# Patient Record
Sex: FEMALE | Race: WHITE | Employment: FULL TIME | URBAN - METROPOLITAN AREA
[De-identification: names, ages, dates, MRNs, and addresses within clinical notes are randomized per-mention and may not be internally consistent; named-entity substitution may affect disease eponyms.]

---

## 2020-02-16 ENCOUNTER — HOSPITAL ENCOUNTER (EMERGENCY)
Age: 55
Discharge: HOME | End: 2020-02-16
Attending: EMERGENCY MEDICINE
Payer: SELF-PAY

## 2020-02-16 ENCOUNTER — APPOINTMENT (OUTPATIENT)
Dept: CT IMAGING | Age: 55
End: 2020-02-16
Attending: EMERGENCY MEDICINE
Payer: SELF-PAY

## 2020-02-16 VITALS
RESPIRATION RATE: 16 BRPM | HEART RATE: 65 BPM | HEIGHT: 64 IN | DIASTOLIC BLOOD PRESSURE: 101 MMHG | SYSTOLIC BLOOD PRESSURE: 172 MMHG | TEMPERATURE: 97.8 F | OXYGEN SATURATION: 100 % | BODY MASS INDEX: 21.38 KG/M2 | WEIGHT: 125.22 LBS

## 2020-02-16 DIAGNOSIS — S01.01XA LACERATION OF SCALP, INITIAL ENCOUNTER: ICD-10-CM

## 2020-02-16 DIAGNOSIS — S00.03XA CONTUSION OF SCALP, INITIAL ENCOUNTER: Primary | ICD-10-CM

## 2020-02-16 PROCEDURE — 70450 CT HEAD/BRAIN W/O DYE: CPT

## 2020-02-16 PROCEDURE — 99281 EMR DPT VST MAYX REQ PHY/QHP: CPT

## 2020-02-16 NOTE — ED TRIAGE NOTES
Pt. States she went out side last night after midnight to smoke and missed a step and fell on bottom and then hit the back of her head and has a lac to back of head. Pt. Denies any LOC or N/V. Pt. Juany Gannon to [facility] and they sent here here \"for a cat scan\".

## 2020-02-16 NOTE — ED PROVIDER NOTES
Mrs. Genet Varela is a 28-year-old female who presents to the with complaints of a fall last night. At approximately midnight, she was walking down the steps. She slipped and fell back and hit her head on the sidewalk. She did not lose consciousness. She said that she felt well before the fall. She did not see much intentional last night because she hit her head. She denies nausea or vomiting. She complains of headache. [facility] and was referred to the ER for head CT. No vomiting. No numbness, tingling, weakness. She denies any other complaints. She states that she is up-to-date with her tetanus shot. Her last shot was 4 years ago. Past Medical History:   Diagnosis Date    Arthritis     Hypertension     Ill-defined condition     ? ? Lupus       Past Surgical History:   Procedure Laterality Date    ABDOMEN SURGERY PROC UNLISTED      HX ORTHOPAEDIC      right foot bunion removed         History reviewed. No pertinent family history. Social History     Socioeconomic History    Marital status: LEGALLY      Spouse name: Not on file    Number of children: Not on file    Years of education: Not on file    Highest education level: Not on file   Occupational History    Not on file   Social Needs    Financial resource strain: Not on file    Food insecurity:     Worry: Not on file     Inability: Not on file    Transportation needs:     Medical: Not on file     Non-medical: Not on file   Tobacco Use    Smoking status: Current Every Day Smoker     Packs/day: 0.50    Smokeless tobacco: Never Used   Substance and Sexual Activity    Alcohol use:  Yes     Alcohol/week: 10.0 standard drinks     Types: 10 Cans of beer per week    Drug use: No    Sexual activity: Not on file   Lifestyle    Physical activity:     Days per week: Not on file     Minutes per session: Not on file    Stress: Not on file   Relationships    Social connections:     Talks on phone: Not on file     Gets together: Not on file     Attends [employer] service: Not on file     Active member of club or organization: Not on file     Attends meetings of clubs or organizations: Not on file     Relationship status: Not on file    Intimate partner violence:     Fear of current or ex partner: Not on file     Emotionally abused: Not on file     Physically abused: Not on file     Forced sexual activity: Not on file   Other Topics Concern    Not on file   Social History Narrative    Not on file         ALLERGIES: Carrot and Codeine    Review of Systems   Constitutional: Negative for chills and fever. HENT: Negative for rhinorrhea and sore throat. Respiratory: Negative for cough and shortness of breath. Cardiovascular: Negative for chest pain. Gastrointestinal: Negative for abdominal pain, diarrhea, nausea and vomiting. Genitourinary: Negative for dysuria and urgency. Musculoskeletal: Negative for arthralgias and back pain. Skin: Positive for wound. Negative for rash. Neurological: Positive for headaches. Negative for dizziness, weakness and light-headedness.        Vitals:    02/16/20 1604   BP: (!) 172/101   Pulse: 65   Resp: 16   Temp: 97.8 °F (36.6 °C)   SpO2: 100%   Weight: 56.8 kg (125 lb 3.5 oz)   Height: 5' 4\" (1.626 m)            Physical Exam     Const:  No acute distress, well developed, well nourished  Head:  Atraumatic, normocephalic  Eyes:  PERRL, conjunctiva normal, no scleral icterus  Neck:  Supple, trachea midline  Cardiovascular:  Regular rate  Resp:  No resp distress, no increased work of breathing  Abd:  non-distended  :  Deferred  MSK:  No pedal edema, normal ROM  Neuro:  Alert and oriented x3, no cranial nerve defect  Skin:  3 cm superficial laceration to the occiput  Psych: normal mood and affect, behavior is normal, judgement and thought content is normal        MDM  Number of Diagnoses or Management Options  Contusion of scalp, initial encounter: Laceration of scalp, initial encounter:      Amount and/or Complexity of Data Reviewed  Tests in the radiology section of CPT®: ordered and reviewed  Review and summarize past medical records: yes    Patient Progress  Patient progress: stable          Mrs. Natasha Linares is a 60-year-old female presents to the ER after injuring her head. She is well-appearing in the ER. CT is negative for bleed or fracture. Her laceration occurred approximately 15 hours prior to arrival in the ER. Had a lengthy discussion with her about the risk and benefits of closing her wound. She elected to not have a closed this time. She Would like a bandage placed. Therefore, we will bandage her wound before she goes. She she is to follow-up with her doctor or return to the ER with any new or worsening symptoms.          Procedures

## 2020-02-16 NOTE — ED NOTES
Dr. Romayne Olp and I have reviewed discharge instructions with the patient. The patient verbalized understanding. Head was wrapped with coban and pt. States it made it feel better.

## 2020-08-19 ENCOUNTER — NURSE TRIAGE (OUTPATIENT)
Dept: OTHER | Facility: CLINIC | Age: 55
End: 2020-08-19

## 2020-08-19 NOTE — TELEPHONE ENCOUNTER
Pt needing scheduled for rheumatologist     Reason for Disposition   Caller has already spoken with another triager or PCP (or office), and has further questions and triager able to answer questions.     Protocols used: NO CONTACT OR DUPLICATE CONTACT CALL-ADULT-[place]

## 2020-12-02 ENCOUNTER — OFFICE VISIT (OUTPATIENT)
Dept: RHEUMATOLOGY | Age: 55
End: 2020-12-02
Payer: MEDICAID

## 2020-12-02 VITALS
TEMPERATURE: 96.9 F | HEIGHT: 64 IN | BODY MASS INDEX: 23.05 KG/M2 | SYSTOLIC BLOOD PRESSURE: 135 MMHG | DIASTOLIC BLOOD PRESSURE: 83 MMHG | WEIGHT: 135 LBS | HEART RATE: 74 BPM | RESPIRATION RATE: 18 BRPM

## 2020-12-02 DIAGNOSIS — M19.042 PRIMARY OSTEOARTHRITIS OF BOTH HANDS: ICD-10-CM

## 2020-12-02 DIAGNOSIS — Z79.60 LONG-TERM USE OF IMMUNOSUPPRESSANT MEDICATION: ICD-10-CM

## 2020-12-02 DIAGNOSIS — M19.041 PRIMARY OSTEOARTHRITIS OF BOTH HANDS: ICD-10-CM

## 2020-12-02 DIAGNOSIS — M06.9 RHEUMATOID ARTHRITIS WITH UNKNOWN RHEUMATOID FACTOR STATUS (HCC): Primary | ICD-10-CM

## 2020-12-02 DIAGNOSIS — M16.0 BILATERAL PRIMARY OSTEOARTHRITIS OF HIP: ICD-10-CM

## 2020-12-02 PROCEDURE — 99205 OFFICE O/P NEW HI 60 MIN: CPT | Performed by: INTERNAL MEDICINE

## 2020-12-02 RX ORDER — IBUPROFEN 200 MG
400 TABLET ORAL
COMMUNITY
End: 2021-05-04 | Stop reason: ALTCHOICE

## 2020-12-02 RX ORDER — HYDROXYCHLOROQUINE SULFATE 200 MG/1
400 TABLET, FILM COATED ORAL DAILY
Qty: 180 TAB | Refills: 1 | Status: SHIPPED | OUTPATIENT
Start: 2020-12-02 | End: 2021-05-04 | Stop reason: ALTCHOICE

## 2020-12-02 NOTE — PROGRESS NOTES
REASON FOR VISIT    This is the initial evaluation for Ms. Rosa chavez 54 y.o. WHITE OR  female for question of an inflammatory arthritis. The patient is self-referred to the [facility]. HISTORY OF PRESENT ILLNESS      I have reviewed and summarized old records from [place]    In 2015, she developed pain in her hands (PIPs, MCP). She saw rheumatology, Dr. Noberto Prader, who diagnosed her with Rheumatoid Arthritis who had her on hydroxychloroquine 200 mg daily which helped very much. She could not follow up due to insurance changes so has been off for 2 years. Today, she complains of pain in her hands that is aching and some throbbing. She has difficulty opening jars due to . The pain is all day but improves with use unless she over uses them. Warm water and heat helps. She has morning stiffness lasting at least an hour. cold makes her feel worse. Ibuprofen helps when she takes it. She also has pain in her [facility] that is worse with grabbing. She also reports outer hip stiffness that is worse in the morning lasting an hour and improves with activity. She has pain in her hip (groin) when she first bears weight and moves, it improves. Ibuprofen also helps. She has little stiffness in her shoulders. She has some pain pain in her MTPs (feet) after work and standing but not in the morning. She had bunionectomy 20 years so does not wear high heals. She reports having a high pain tolerance. She is a smoker for 35 years. Therapy History includes:  Current DMARD therapy includes: none  Prior DMARD therapy includes: hydroxychloroquine 200 mg daily (2015 to 2017)  The following DMARDs have been ineffective: none  The following DMARDs were stopped because of side effects: none    REVIEW OF SYSTEMS    A 15 point review of systems was performed and summarized below. The questionnaire was reviewed with the patient and scanned into the patient's medical record.     General: denies recent weight gain, recent weight loss, fatigue, weakness, fever, drenching night sweats  Musculoskeletal: endorses joint pain, joint swelling, morning stiffness (lasting 1.5 hours), denies muscle pain  Ears: denies ringing in ears, hearing loss, deafness  Eyes: denies pain, light sensitive, redness, blindness, double vision, blurred vision, excess tearing, dryness, foreign body sensation  Mouth: denies sore tongue, oral ulcers, loss of taste, dryness, increased dental caries  Nose: denies nosebleeds, nasal ulcers  Throat: denies food stuck when swallowing, difficulty with swallowing, hoarseness, pain in jaw while chewing  Neck: denies swollen glands, tender glands  Cardiopulmonary: denies pain in chest with deep breaths, pain in chest when lying down, murmurs, sudden changes in heart beat, wheezing, dry cough, productive cough, shortness of breath at rest, shortness of breath on exertion, coughing of blood  Gastrointestinal: denies nausea, heartburn, stomach pain relieved by food, chronic constipation, chronic diarrhea, blood in stools, black stools  Genitourinary: denies vaginal dryness, pain or burning on urination, blood in urine, cloudy urine, vaginal ulcers   Hematologic: denies anemia, bleeding tendency, blood clots, bleeding gums  Skin: denies easy bruising, hair loss, rash, rash worsened after sun exposure, hives/urticaria, skin thickening, skin tightness, nodules/bumps, color changes of hands or feet in the cold (Raynaud's)  Neurologic: denies numbness or tingling in hands, numbness or tingling in feet, muscle weakness  Psychiatric: denies depression, excessive worries, PTSD, Bipolar  Sleep: endorses snoring, denies poor sleep (7-9 hours), apnea, daytime somnolence, difficulty falling asleep, difficulty staying asleep     PAST MEDICAL HISTORY    She has a past medical history of Arthritis and Hypertension.      FAMILY HISTORY    Her family history includes Arthritis-osteo in her father and mother; Arthritis-rheumatoid in her brother; Diabetes in her father and mother; Gout in her brother and sister; Heart Attack in her father; Other in her father and mother. SOCIAL HISTORY    She reports that she has been smoking. She has been smoking about 0.50 packs per day. She has never used smokeless tobacco. She reports current alcohol use of about 10.0 standard drinks of alcohol per week. She reports that she does not use drugs. MEDICATIONS    Current Outpatient Medications   Medication Sig    ibuprofen (MOTRIN) 200 mg tablet Take 400 mg by mouth every six (6) hours as needed for Pain.  hydrOXYchloroQUINE (PLAQUENIL) 200 mg tablet Take 2 Tabs by mouth daily. No current facility-administered medications for this visit. ALLERGIES  Allergies   Allergen Reactions    Carrot Other (comments)     \"upset stomach\"    Codeine Palpitations       PHYSICAL EXAMINATION    Visit Vitals  /83   Pulse 74   Temp 96.9 °F (36.1 °C)   Resp 18   Ht 5' 4\" (1.626 m)   Wt 135 lb (61.2 kg)   BMI 23.17 kg/m²     Body mass index is 23.17 kg/m². General: Patient is alert, oriented x 3, not in acute distress    HEENT:   Conjunctiva are not injected and appear moist, there is no alopecia. Cardiovascular:  Heart is regular rate and rhythm, no murmurs. Chest:  Lungs are clear to auscultation bilaterally. Extremities:  Free of clubbing, cyanosis, edema, extremities well perfused. Neurological exam:  Muscle strength is full in upper and lower extremities. Skin exam:  There are no rashes, no tophi, no psoriasis, no active Raynaud's, no livedo reticularis, no periungual erythema. Musculoskeletal exam:  A comprehensive musculoskeletal exam was performed for all joints of each upper and lower extremity and assessed for swelling, tenderness and range of motion. Pertinent results are documented as below:    Bilateral Tanner and Heberden nodes.   Bilateral squaring of CMC  Pain in left hip with inward rotation with decreased ROM  Pain in right right with outward rotation with decreased ROM  Bilateral knee crepitus without effusion. Bilateral MTP squeeze tenderness    Z-Deformities:   no  Daphne Neck Deformities:  no  Boutonierre's Deformities:  no  Ulnar Deviation:   no  MCP Subluxation:  no    Joint Count 12/2/2020   Patient pain (0-100) 70   MHAQ 0.125   Left 1st MCP - Tender 0   Left 1st MCP - Swollen 1   Left 2nd MCP - Tender 1   Left 2nd MCP - Swollen 1   Left 4th MCP - Tender 1   Left 4th MCP - Swollen 0   Left 5th MCP - Tender 1   Left 5th MCP - Swollen 1   Left thumb IP - Tender 1   Left thumb IP - Swollen 1   Left 2nd PIP - Tender 0   Left 2nd PIP - Swollen 1   Left 3rd PIP - Tender 0   Left 3rd PIP - Swollen 1   Left 4th PIP - Tender 1   Left 4th PIP - Swollen 1   Left 5th PIP - Tender 1   Left 5th PIP - Swollen 1   Right elbow - Tender 1   Right elbow - Swollen 1   Right wrist- Tender 1   Right wrist- Swollen 0   Right 1st MCP - Tender 0   Right 1st MCP - Swollen 1   Right 2nd PIP - Tender 0   Right 2nd PIP - Swollen 1   Right 3rd PIP - Tender 1   Right 3rd PIP - Swollen 1   Right 4th PIP - Tender 1   Right 4th PIP - Swollen 1   Right 5th PIP - Tender 1   Tender Joint Count (Total) 11   Swollen Joint Count (Total) 13   Physician Assessment (0-10) 4   Patient Assessment (0-10) 5   CDAI Total (calculated) 33       DATA REVIEW    Prior medical records were reviewed and are summarized as below:    Laboratory data: summarized in the HPI    Imaging: summarized in the HPI. ASSESSMENT AND PLAN    1) Rheumatoid Arthritis. She has a history of symmetric synovitis treated previously by Dr. Radha Beasley with hydroxychlorquine 200 mg daily with relief. She has been off for more than 2 years due to insurance issues.     Interestingly, she also has bilateral hip involvement, which suggests an inflammatory process versus osteoarthritis     I will resume hydroxychloroquine weight based at 400 mg daily since it had helped her previously, and reassess her in 3 months. I ordered labs and radiographs. 2) Long Term Use of Hydroxychloroquine (Plaquenil). She has annual exams. She will initiate therapy and was counseled about monitoring. 3) Bilateral Hand/ CMCOsteoarthritis. The patient has osteoarthritis, which is also known as \"wear and tear arthritis,\" non-inflammatory arthritis or mechanical arthritis. There are hereditary, vocational and posttraumatic joint injuries predisposing factors. I recommend non-pharmacologic modalities such as keeping hands warm, avoid activities that case pain, warm water soaks, in addition to (2) pharmacologic modalities such as acetaminophen as first line, oral and topical NSAIDs as second line. Naproxen is a low HERNANDEZ-2 selectivity inhibitor that poses lower cardiovascular risk than other NSAIDs (Angiomatthew VALE, Mehrdad BERMAN. Clinical Pharmacology and Cardiovascular Safety of Naproxen. Am J Cardiovasc Drugs. 2016 Nov 8). NSAIDs should not be used in patients on blood thinners, chronic kidney disease, high risk coronary artery disease, and inflammatory bowel disease (ulcerative colitis or Crohn's disease) I recommended ball squeezing and holding until hand fatigue then alternating to other hand exercises 10 times twice daily. 4) Bilateral Hip Arthritis. See #1. The patient voiced understanding of the aforementioned assessment and plan. Summary of plan was provided in the After Visit Summary patient instructions. I also provided education about [employer] setup and utility.     TODAY'S ORDERS  Orders Placed This Encounter    QUANTIFERON-TB GOLD PLUS    XR HAND RT MIN 3 V    XR FOOT LT MIN 3 V    XR FOOT RT MIN 3 V    XR HAND LT MIN 3 V    XR HIPS BI W PELV 3 OR 4 VWS    CYCLIC CITRUL PEPTIDE AB, IGG    CBC WITH AUTOMATED DIFF    CHRONIC HEPATITIS PANEL    METABOLIC PANEL, COMPREHENSIVE    C REACTIVE PROTEIN, QT    SED RATE (ESR)    RHEUMATOID FACTOR, QL    PROTEIN ELECTROPHORESIS W/ REFLX NEYDA    URIC ACID    VITAMIN D, 25 HYDROXY    hydrOXYchloroQUINE (PLAQUENIL) 200 mg tablet     Future Appointments   Date Time Provider Chidi Pollard   3/3/2021  1:00 PM Princess Ashley Benites MD [facility] BS AMB     Héctor Fernandez MD, [address] [place] [address]    Adult Rheumatology   Rheumatology Ultrasound Certified  [id number] [facility] [address]  [facility], [address] [place] [address]   Phone [phone]  Fax [phone]

## 2020-12-02 NOTE — LETTER
12/2/20 Patient: Aramis Velasquez YOB: 1965 Date of Visit: 12/2/2020 Deepika Vasquez MD 
[address] [id number] VIA Facsimile: [phone] Dear Deepika Vasquez MD, Thank you for referring Ms. Aramis Velasquez to [facility] for evaluation. My notes for this consultation are attached. If you have questions, please do not hesitate to call me. I look forward to following your patient along with you.  
 
 
Sincerely, 
 
Viji Brunson MD

## 2021-01-21 ENCOUNTER — LAB ONLY (OUTPATIENT)
Dept: FAMILY MEDICINE CLINIC | Age: 56
End: 2021-01-21

## 2021-01-21 DIAGNOSIS — M06.9 RHEUMATOID ARTHRITIS WITH UNKNOWN RHEUMATOID FACTOR STATUS (HCC): ICD-10-CM

## 2021-01-22 LAB — CCP IGA+IGG SERPL IA-ACNC: 9 UNITS (ref 0–19)

## 2021-01-23 LAB
ALBUMIN SERPL ELPH-MCNC: 3.9 G/DL (ref 2.9–4.4)
ALBUMIN/GLOB SERPL: 1.4 {RATIO} (ref 0.7–1.7)
ALPHA1 GLOB SERPL ELPH-MCNC: 0.2 G/DL (ref 0–0.4)
ALPHA2 GLOB SERPL ELPH-MCNC: 0.8 G/DL (ref 0.4–1)
B-GLOBULIN SERPL ELPH-MCNC: 1 G/DL (ref 0.7–1.3)
COMMENT, 144067: NORMAL
GAMMA GLOB SERPL ELPH-MCNC: 0.9 G/DL (ref 0.4–1.8)
GLOBULIN SER CALC-MCNC: 2.8 G/DL (ref 2.2–3.9)
HBV CORE AB SERPL QL IA: NEGATIVE
HBV CORE IGM SERPL QL IA: NEGATIVE
HBV E AB SERPL QL IA: NEGATIVE
HBV E AG SERPL QL IA: NEGATIVE
HBV SURFACE AB SER QL: NON REACTIVE INDEX VALUE
HBV SURFACE AG SERPL QL IA: NEGATIVE
HCV AB S/CO SERPL IA: <0.1 S/CO RATIO (ref 0–0.9)
M PROTEIN SERPL ELPH-MCNC: NORMAL G/DL
PROT PATTERN SERPL ELPH-IMP: NORMAL
PROT SERPL-MCNC: 6.7 G/DL (ref 6–8.5)

## 2021-01-24 LAB
25(OH)D3 SERPL-MCNC: 13.9 NG/ML (ref 30–100)
ALBUMIN SERPL-MCNC: 3.9 G/DL (ref 3.5–5)
ALBUMIN/GLOB SERPL: 1.2 {RATIO} (ref 1.1–2.2)
ALP SERPL-CCNC: 75 U/L (ref 45–117)
ALT SERPL-CCNC: 23 U/L (ref 12–78)
ANION GAP SERPL CALC-SCNC: 3 MMOL/L (ref 5–15)
AST SERPL-CCNC: 13 U/L (ref 15–37)
BASOPHILS # BLD: 0.1 K/UL (ref 0–0.1)
BASOPHILS NFR BLD: 2 % (ref 0–1)
BILIRUB SERPL-MCNC: 0.4 MG/DL (ref 0.2–1)
BUN SERPL-MCNC: 9 MG/DL (ref 6–20)
BUN/CREAT SERPL: 13 (ref 12–20)
CALCIUM SERPL-MCNC: 9.4 MG/DL (ref 8.5–10.1)
CHLORIDE SERPL-SCNC: 107 MMOL/L (ref 97–108)
CO2 SERPL-SCNC: 30 MMOL/L (ref 21–32)
CREAT SERPL-MCNC: 0.68 MG/DL (ref 0.55–1.02)
CRP SERPL-MCNC: <0.29 MG/DL (ref 0–0.6)
DIFFERENTIAL METHOD BLD: ABNORMAL
EOSINOPHIL # BLD: 0.1 K/UL (ref 0–0.4)
EOSINOPHIL NFR BLD: 2 % (ref 0–7)
ERYTHROCYTE [DISTWIDTH] IN BLOOD BY AUTOMATED COUNT: 13 % (ref 11.5–14.5)
ERYTHROCYTE [SEDIMENTATION RATE] IN BLOOD: 13 MM/HR (ref 0–30)
GLOBULIN SER CALC-MCNC: 3.2 G/DL (ref 2–4)
GLUCOSE SERPL-MCNC: 134 MG/DL (ref 65–100)
HCT VFR BLD AUTO: 40.3 % (ref 35–47)
HGB BLD-MCNC: 13.3 G/DL (ref 11.5–16)
IMM GRANULOCYTES # BLD AUTO: 0 K/UL (ref 0–0.04)
IMM GRANULOCYTES NFR BLD AUTO: 1 % (ref 0–0.5)
LYMPHOCYTES # BLD: 1.3 K/UL (ref 0.8–3.5)
LYMPHOCYTES NFR BLD: 30 % (ref 12–49)
M TB IFN-G BLD-IMP: NEGATIVE
MCH RBC QN AUTO: 31.1 PG (ref 26–34)
MCHC RBC AUTO-ENTMCNC: 33 G/DL (ref 30–36.5)
MCV RBC AUTO: 94.2 FL (ref 80–99)
MONOCYTES # BLD: 0.4 K/UL (ref 0–1)
MONOCYTES NFR BLD: 10 % (ref 5–13)
NEUTS SEG # BLD: 2.4 K/UL (ref 1.8–8)
NEUTS SEG NFR BLD: 55 % (ref 32–75)
NRBC # BLD: 0 K/UL (ref 0–0.01)
NRBC BLD-RTO: 0 PER 100 WBC
PLATELET # BLD AUTO: 268 K/UL (ref 150–400)
PMV BLD AUTO: 9.7 FL (ref 8.9–12.9)
POTASSIUM SERPL-SCNC: 4.5 MMOL/L (ref 3.5–5.1)
PROT SERPL-MCNC: 7.1 G/DL (ref 6.4–8.2)
QUANTIFERON CRITERIA, QFI1T: NORMAL
QUANTIFERON INCUBATION, QF1T: NORMAL
QUANTIFERON MITOGEN VALUE: >10 IU/ML
QUANTIFERON NIL VALUE: 0.07 IU/ML
QUANTIFERON TB1 AG: 0.09 IU/ML
QUANTIFERON TB2 AG: 0.07 IU/ML
RBC # BLD AUTO: 4.28 M/UL (ref 3.8–5.2)
SODIUM SERPL-SCNC: 140 MMOL/L (ref 136–145)
URATE SERPL-MCNC: 5.9 MG/DL (ref 2.6–6)
WBC # BLD AUTO: 4.3 K/UL (ref 3.6–11)

## 2021-01-25 DIAGNOSIS — Z79.60 LONG-TERM USE OF IMMUNOSUPPRESSANT MEDICATION: Primary | ICD-10-CM

## 2021-01-25 DIAGNOSIS — E55.9 VITAMIN D DEFICIENCY: ICD-10-CM

## 2021-01-25 RX ORDER — ERGOCALCIFEROL 1.25 MG/1
50000 CAPSULE ORAL
Qty: 12 CAP | Refills: 4 | Status: SHIPPED | OUTPATIENT
Start: 2021-01-25

## 2021-01-25 NOTE — PROGRESS NOTES
The results were reviewed. Vitamin D is low (13.9). I will prescribe weekly vitamin D called ergocalciferol 50,000. Stop daily supplement. Rheumatoid factor was not drawn. All remaining labs are normal/negative.

## 2021-02-23 DIAGNOSIS — M06.9 RHEUMATOID ARTHRITIS WITH UNKNOWN RHEUMATOID FACTOR STATUS (HCC): Primary | ICD-10-CM

## 2021-02-25 ENCOUNTER — APPOINTMENT (OUTPATIENT)
Dept: FAMILY MEDICINE CLINIC | Age: 56
End: 2021-02-25

## 2021-03-03 ENCOUNTER — OFFICE VISIT (OUTPATIENT)
Dept: RHEUMATOLOGY | Age: 56
End: 2021-03-03
Payer: MEDICAID

## 2021-03-03 VITALS
HEART RATE: 61 BPM | DIASTOLIC BLOOD PRESSURE: 89 MMHG | TEMPERATURE: 98 F | HEIGHT: 64 IN | RESPIRATION RATE: 18 BRPM | WEIGHT: 133 LBS | SYSTOLIC BLOOD PRESSURE: 168 MMHG | BODY MASS INDEX: 22.71 KG/M2

## 2021-03-03 DIAGNOSIS — E55.9 VITAMIN D DEFICIENCY: ICD-10-CM

## 2021-03-03 DIAGNOSIS — Z79.60 LONG-TERM USE OF IMMUNOSUPPRESSANT MEDICATION: ICD-10-CM

## 2021-03-03 DIAGNOSIS — M06.09 SERONEGATIVE RHEUMATOID ARTHRITIS OF MULTIPLE SITES (HCC): Primary | ICD-10-CM

## 2021-03-03 PROCEDURE — 99215 OFFICE O/P EST HI 40 MIN: CPT | Performed by: INTERNAL MEDICINE

## 2021-03-03 RX ORDER — FOLIC ACID 1 MG/1
1 TABLET ORAL DAILY
Qty: 90 TAB | Refills: 5 | Status: SHIPPED | OUTPATIENT
Start: 2021-03-03 | End: 2021-05-04 | Stop reason: SDUPTHER

## 2021-03-03 RX ORDER — CHOLECALCIFEROL (VITAMIN D3) 125 MCG
CAPSULE ORAL DAILY
COMMUNITY
End: 2021-05-04 | Stop reason: ALTCHOICE

## 2021-03-03 RX ORDER — METHOTREXATE 2.5 MG/1
20 TABLET ORAL
Qty: 96 TAB | Refills: 0 | Status: SHIPPED | OUTPATIENT
Start: 2021-03-08 | End: 2021-05-04 | Stop reason: SDUPTHER

## 2021-03-03 NOTE — PROGRESS NOTES
REASON FOR VISIT    This is a follow-up visit for Ms. Mario Lozano for     ICD-10-CM   1. Seronegative rheumatoid arthritis of multiple sites ([facility].)  M06.09     Inflammatory arthritis phenotype includes:  Anti-CCP positive: no  Rheumatoid factor positive: N/A  Erosive disease: no  Extra-articular manifestations include: none    Immunosuppression Screening (1/21/2021): Quantiferon TB: negative  PPD:  Not performed  Hepatitis B: negative  Hepatitis C: negative    Therapy History includes:  Current DMARD therapy include: hydroxychloroquine 200 mg daily (2015 to 2017, 12/02/2020 to present)  Prior DMARD therapy include: none  Discontinued DMARDs because of inefficacy: None  Discontinued DMARDs because of side effects: None    HISTORY OF PRESENT ILLNESS    Ms. Mario Lozano returns for a follow-up. On her last visit, I ordered labs and radiographs and resumed hydroxychloroquine but at 400 mg daily but she had been taking it once daily rather than 400 mg daily due to thinking she needs to take twice daily. The patient has not suffered a side effect from treatment:    Today, she feels pretty well. He endorses stiffness in her hands in the morning lasting 2 to 3 hours and thinks there is swelling without pain. She feels better with activity. She has pain in her hands with use in certain maneuvers such has opening. She continues to have pain in her groin when she ambulates or climbs ladders, but she feels better with activity. Her radiographs showed osteoarthritis. She denies fever, weight loss, blurred vision, vision loss, oral ulcers, ankle swelling, dry cough, dyspnea, nausea, vomiting, dysphagia, abdominal pain, black or bloody stool, fall since last visit, rash, easy bruising and increased thirst.    Most recent toxicity monitoring by blood work was done on 1/21/2021 and did not reveal any significant adverse effects    Most recent inflammatory markers from 1/21/2021  revealed a ESR 13 mm/hr and CRP <0.29 mg/L.     I reviewed the patient's interval medical history, surgical history, medications, and allergies. The patient has not had any interval hospital admissions, infections, or surgeries. REVIEW OF SYSTEMS    A comprehensive review of systems was performed and pertinent results are documented in the HPI, review of systems is otherwise non-contributory. PAST MEDICAL HISTORY    She has a past medical history of Arthritis and Hypertension. FAMILY HISTORY    Her family history includes Arthritis-osteo in her father and mother; Arthritis-rheumatoid in her brother; Diabetes in her father and mother; Gout in her brother and sister; Heart Attack in her father; Other in her father and mother. SOCIAL HISTORY    She reports that she has been smoking. She has been smoking about 0.50 packs per day. She has never used smokeless tobacco. She reports current alcohol use of about 10.0 standard drinks of alcohol per week. She reports that she does not use drugs. MEDICATIONS    Current Outpatient Medications   Medication Sig    cholecalciferol, vitamin D3, (Vitamin D3) 50 mcg (2,000 unit) tab Take  by mouth daily.  [START ON 3/8/2021] methotrexate (RHEUMATREX) 2.5 mg tablet Take 8 Tabs by mouth every Monday.  folic acid (FOLVITE) 1 mg tablet Take 1 Tab by mouth daily.  ibuprofen (MOTRIN) 200 mg tablet Take 400 mg by mouth every six (6) hours as needed for Pain.  hydrOXYchloroQUINE (PLAQUENIL) 200 mg tablet Take 2 Tabs by mouth daily. (Patient taking differently: Take 200 mg by mouth daily.)    ergocalciferol (ERGOCALCIFEROL) 1,250 mcg (50,000 unit) capsule Take 1 Cap by mouth every seven (7) days. Indications: vitamin D deficiency (high dose therapy)     No current facility-administered medications for this visit.       ALLERGIES    Allergies   Allergen Reactions    Carrot Other (comments)     \"upset stomach\"    Codeine Palpitations       PHYSICAL EXAMINATION    Visit Vitals  BP (!) 168/89   Pulse 61   Temp 98 °F (36.7 °C)   Resp 18   Ht 5' 4\" (1.626 m)   Wt 133 lb (60.3 kg)   BMI 22.83 kg/m²     Body mass index is 22.83 kg/m². General: Patient is alert, oriented x 3, not in acute distress    HEENT:   Sclerae are not injected and appear moist.  There is no alopecia. Neck is supple     HEENT:   Conjunctiva are not injected and appear moist, there is no alopecia. Cardiovascular:  Heart is regular rate and rhythm, no murmurs. Chest:  Lungs are clear to auscultation bilaterally. Extremities:  Free of clubbing, cyanosis, edema, extremities well perfused. Neurological exam:  Muscle strength is full in upper and lower extremities. Skin exam:  There are no rashes, no tophi, no psoriasis, no active Raynaud's, no livedo reticularis, no periungual erythema. Musculoskeletal exam:  A comprehensive musculoskeletal exam was performed for all joints of each upper and lower extremity and assessed for swelling, tenderness and range of motion. Positive results are documented as below:       Bilateral Tanner and Heberden nodes. Bilateral squaring of CMC  Pain in left hip with inward rotation with decreased ROM  Pain in right right with outward rotation with decreased ROM  Bilateral knee crepitus without effusion.   Bilateral MTP squeeze tenderness    Z-Deformities:   no  [place] Neck Deformities:  no  Boutonierre's Deformities:  no  Ulnar Deviation:   no  MCP Subluxation:  no       Joint Count 3/3/2021 12/2/2020   Patient pain (0-100) 55 70   MHAQ 0.375 0.125   Left 1st MCP - Tender - 0   Left 1st MCP - Swollen - 1   Left 2nd MCP - Tender - 1   Left 2nd MCP - Swollen - 1   Left 4th MCP - Tender - 1   Left 4th MCP - Swollen - 0   Left 5th MCP - Tender - 1   Left 5th MCP - Swollen - 1   Left thumb IP - Tender - 1   Left thumb IP - Swollen - 1   Left 2nd PIP - Tender 0 0   Left 2nd PIP - Swollen 1 1   Left 3rd PIP - Tender - 0   Left 3rd PIP - Swollen - 1   Left 4th PIP - Tender 1 1   Left 4th PIP - Swollen 1 1   Left 5th PIP - Tender 1 1   Left 5th PIP - Swollen 1 1   Right elbow - Tender - 1   Right elbow - Swollen - 1   Right wrist- Tender - 1   Right wrist- Swollen - 0   Right 1st MCP - Tender - 0   Right 1st MCP - Swollen - 1   Right 5th MCP - Tender 1 -   Right 5th MCP - Swollen 0 -   Right thumb IP - Tender 1 -   Right thumb IP - Swollen 0 -   Right 2nd PIP - Tender - 0   Right 2nd PIP - Swollen - 1   Right 3rd PIP - Tender 1 1   Right 3rd PIP - Swollen 1 1   Right 4th PIP - Tender 1 1   Right 4th PIP - Swollen 1 1   Right 5th PIP - Tender 1 1   Right 5th PIP - Swollen 1 -   Tender Joint Count (Total) 7 11   Swollen Joint Count (Total) 6 13   Physician Assessment (0-10) - 4   Patient Assessment (0-10) 4 5   CDAI Total (calculated) - 35       DATA REVIEW    Laboratory     Recent laboratory results were reviewed, summarized, and discussed with the patient. Imaging    Musculoskeletal Ultrasound    None    Radiographs    Bilateral Hand 2/25/2021: RIGHT: There is no acute fracture or dislocation. There are moderate first carpometacarpal osteoarthritic degenerative changes. There are mild DIP osteoarthritic degenerative changes. Bones are well mineralized. Soft tissues are normal. No osseous erosion is visualized. LEFT: There is no acute fracture or dislocation. There are moderate first carpometacarpal osteoarthritic degenerative changes. Bones are well mineralized. Soft tissues are normal. No osseous erosion is visualized. Bilateral Hip 2/25/2021: no fracture, dislocation or other acute abnormality. Degenerative changes are seen in the hip joints bilaterally.     Bilateral Foot 2/25/2021: RIGHT: No acute fracture or dislocation. Degenerative changes are seen between the first metatarsal head and sesamoid bones. There is mild first MTP osteoarthritis. No erosions. The soft tissues appear unremarkable. LEFT: no fracture or other acute osseous or articular abnormality.  The soft tissues are within normal limits.     CT Imaging    None    MR Imaging    None    DXA     None    ASSESSMENT AND PLAN    This is a follow-up visit for Ms. Branden Doyle. 1) Seronegative Rheumatoid Arthritis. She has a history of symmetric synovitis treated previously by Dr. Vega Grider with hydroxychlorquine 200 mg daily with relief. She has been off for more than 2 years due to insurance issues. I resumed hydroxychlorquine but at 400 mg daily, however, she continued 200 mg daily thinking it necessitated a BID dosing with good. She feels better but continues to have active.      We discussed initiation of DMARD therapy with methotrexate, which is first line therapy in Rheumatoid Arthritis. It is a weekly regimen that is supplemented with daily folic acid to help counteract potential side effects. I discussed with the patient the potential adverse effects, which include: nausea, vomiting, dyspepsia, oral ulcers, hair thinning, infection, liver function abnormalities, blood count abnormalities, and rarely pneumonitis or melanoma. The patient understood these possible adverse effects. I informed the patient of the need for routine CBC and CMP as a measure for long term use of immunosuppressants. I also instructed the patient to avoid ill contacts and the needs for annual influenza vaccines and the pneumonia vaccines. I asked the patient to apply SPF 50 sunscreen when out in the sun. The patient was also instructed to avoid alcohol as it may hasten hepatotoxicity. In childbearing patients, pregnancy is contra-indicated while on methotrexate due to risk for birth defects and early termination. I prescribed methotrexate 20 mg every MONDAY with daily folic acid 1 mg with instructions to take 15 mg once and if tolerated, to increase to 20 mg the following week. I informed the patient that methotrexate may take 6 to 12 weeks to be effective. Patient was informed to contact me if they develop any adverse reaction or infection.     I ordered labs to be drawn before the 4th dose of methotrexate. I asked her to finish off her HCQ and not to refill it. 2) Long Term Use of Hydroxychloroquine (Plaquenil). She has annual exams. She will initiate therapy and was counseled about monitoring.    3) Bilateral Hip Osteoarthritis. 4)  Bilateral Hand/ CMCOsteoarthritis. The     5) Vitamin D Deficiency. The patient's vitamin D level was 13.9. I had prescribed weekly ergocalciferol 50,000 but she is not taking it. The patient voiced understanding of the aforementioned assessment and plan. Summary of plan was provided in the After Visit Summary patient instructions. A total of 40 minutes was spent on this visit, reviewing interval notes, interval testing results, ordering tests, refilling medications, documenting the findings in the note, patient education, counseling, and coordination of care as described above. All questions asked and answered.     TODAY'S ORDERS    Orders Placed This Encounter    CBC WITH AUTOMATED DIFF    METABOLIC PANEL, COMPREHENSIVE    RHEUMATOID FACTOR, QL    cholecalciferol, vitamin D3, (Vitamin D3) 50 mcg (2,000 unit) tab    methotrexate (RHEUMATREX) 2.5 mg tablet    folic acid (FOLVITE) 1 mg tablet     Future Appointments   Date Time Provider Chidi Pollard   3/30/2021 10:45 AM LAB ONLY [facility] BS AMB   5/4/2021 11:20 AM Micheal Benites MD [facility] BS AMB     Shannan Newberry MD, [address] [place] [address]    Adult Rheumatology   Rheumatology Ultrasound Certified  [facility]  A Part of [address]   Phone [phone]  Fax [phone]

## 2021-03-03 NOTE — PATIENT INSTRUCTIONS
METHOTREXATE    Methotrexate is a weekly regimen that is supplemented with daily folic acid to help counteract potential side effects. Potential Adverse Affects    - Nausea  - Vomiting  - Upset stomach  - Oral ulcers  - Hair thinning  - Infection  - Liver function abnormalities  - Blood count abnormalities  - Rarely pneumonitis      Medication Monitoring    You will need routine blood counts and kidney and liver testing as a measure of monitoring for long term use of immunosuppressants. Things You Should Do    You should avoid ill contacts     You should get annual influenza vaccines and the pneumonia vaccines. You should apply SPF 50 sunscreen when out in the sun. You should avoid alcohol the day before, the day of and the day after your dose, as it may hasten hepatotoxicity. You should avoid pregnancy due to risk for birth defects. You should contact me if you are sick. You should hold methotrexate if you are sick and resume after your sickness resolves. If you have any problems or side effects with this medication, please contact me immediately to inform me. Plan    I prescribed methotrexate 20 mg (8 tablets) every MONDAY at bedtime with DAILY folic acid 1 mg. Start with 6 tablets this MONDAY and if tolerated, increase to 8 tablets the following MONDAY and continue 8 tablets every MONDAY. If the folic acid 1 mg is not covered, then you may take two tablets of the over the counter [employer] folic acid 864 mcg (total of 800 mcg daily). Methotrexate may take 6 to 12 weeks to be effective.     Labs    Please do labs before your 4th dose of methotrexate. (lab slip provided to you)

## 2021-03-03 NOTE — LETTER
3/3/2021 Patient: Damian Duncan YOB: 1965 Date of Visit: 3/3/2021 Angie Rojas MD 
 [place] [id number] [address] Fax: [phone] Dear Angie Rojas MD, Thank you for referring Ms. Damian Duncan to [facility] for evaluation. My notes for this consultation are attached. If you have questions, please do not hesitate to call me. I look forward to following your patient along with you.  
 
 
Sincerely, 
 
Sophie Delaney MD

## 2021-03-30 ENCOUNTER — LAB ONLY (OUTPATIENT)
Dept: FAMILY MEDICINE CLINIC | Age: 56
End: 2021-03-30

## 2021-03-30 DIAGNOSIS — M06.09 SERONEGATIVE RHEUMATOID ARTHRITIS OF MULTIPLE SITES (HCC): ICD-10-CM

## 2021-03-30 DIAGNOSIS — Z79.60 LONG-TERM USE OF IMMUNOSUPPRESSANT MEDICATION: ICD-10-CM

## 2021-03-31 LAB
ALBUMIN SERPL-MCNC: 4.1 G/DL (ref 3.5–5)
ALBUMIN/GLOB SERPL: 1.3 {RATIO} (ref 1.1–2.2)
ALP SERPL-CCNC: 70 U/L (ref 45–117)
ALT SERPL-CCNC: 33 U/L (ref 12–78)
ANION GAP SERPL CALC-SCNC: 4 MMOL/L (ref 5–15)
AST SERPL-CCNC: 19 U/L (ref 15–37)
BASOPHILS # BLD: 0.1 K/UL (ref 0–0.1)
BASOPHILS NFR BLD: 2 % (ref 0–1)
BILIRUB SERPL-MCNC: 0.4 MG/DL (ref 0.2–1)
BUN SERPL-MCNC: 14 MG/DL (ref 6–20)
BUN/CREAT SERPL: 19 (ref 12–20)
CALCIUM SERPL-MCNC: 9.2 MG/DL (ref 8.5–10.1)
CHLORIDE SERPL-SCNC: 106 MMOL/L (ref 97–108)
CO2 SERPL-SCNC: 29 MMOL/L (ref 21–32)
CREAT SERPL-MCNC: 0.74 MG/DL (ref 0.55–1.02)
DIFFERENTIAL METHOD BLD: ABNORMAL
EOSINOPHIL # BLD: 0.1 K/UL (ref 0–0.4)
EOSINOPHIL NFR BLD: 2 % (ref 0–7)
ERYTHROCYTE [DISTWIDTH] IN BLOOD BY AUTOMATED COUNT: 13.8 % (ref 11.5–14.5)
GLOBULIN SER CALC-MCNC: 3.1 G/DL (ref 2–4)
GLUCOSE SERPL-MCNC: 112 MG/DL (ref 65–100)
HCT VFR BLD AUTO: 42.4 % (ref 35–47)
HGB BLD-MCNC: 13.4 G/DL (ref 11.5–16)
IMM GRANULOCYTES # BLD AUTO: 0 K/UL (ref 0–0.04)
IMM GRANULOCYTES NFR BLD AUTO: 1 % (ref 0–0.5)
LYMPHOCYTES # BLD: 1.4 K/UL (ref 0.8–3.5)
LYMPHOCYTES NFR BLD: 30 % (ref 12–49)
MCH RBC QN AUTO: 30.7 PG (ref 26–34)
MCHC RBC AUTO-ENTMCNC: 31.6 G/DL (ref 30–36.5)
MCV RBC AUTO: 97 FL (ref 80–99)
MONOCYTES # BLD: 0.4 K/UL (ref 0–1)
MONOCYTES NFR BLD: 9 % (ref 5–13)
NEUTS SEG # BLD: 2.6 K/UL (ref 1.8–8)
NEUTS SEG NFR BLD: 56 % (ref 32–75)
NRBC # BLD: 0 K/UL (ref 0–0.01)
NRBC BLD-RTO: 0 PER 100 WBC
PLATELET # BLD AUTO: 275 K/UL (ref 150–400)
PMV BLD AUTO: 9.5 FL (ref 8.9–12.9)
POTASSIUM SERPL-SCNC: 4.8 MMOL/L (ref 3.5–5.1)
PROT SERPL-MCNC: 7.2 G/DL (ref 6.4–8.2)
RBC # BLD AUTO: 4.37 M/UL (ref 3.8–5.2)
RHEUMATOID FACT SERPL-ACNC: 33 IU/ML
SODIUM SERPL-SCNC: 139 MMOL/L (ref 136–145)
WBC # BLD AUTO: 4.6 K/UL (ref 3.6–11)

## 2021-05-04 ENCOUNTER — OFFICE VISIT (OUTPATIENT)
Dept: RHEUMATOLOGY | Age: 56
End: 2021-05-04
Payer: MEDICAID

## 2021-05-04 VITALS
TEMPERATURE: 97.5 F | RESPIRATION RATE: 18 BRPM | SYSTOLIC BLOOD PRESSURE: 154 MMHG | HEART RATE: 73 BPM | BODY MASS INDEX: 22.66 KG/M2 | DIASTOLIC BLOOD PRESSURE: 93 MMHG | WEIGHT: 132 LBS

## 2021-05-04 DIAGNOSIS — E55.9 VITAMIN D DEFICIENCY: ICD-10-CM

## 2021-05-04 DIAGNOSIS — M05.79 SEROPOSITIVE RHEUMATOID ARTHRITIS OF MULTIPLE SITES (HCC): Primary | ICD-10-CM

## 2021-05-04 DIAGNOSIS — Z79.60 LONG-TERM USE OF IMMUNOSUPPRESSANT MEDICATION: ICD-10-CM

## 2021-05-04 DIAGNOSIS — M06.09 SERONEGATIVE RHEUMATOID ARTHRITIS OF MULTIPLE SITES (HCC): ICD-10-CM

## 2021-05-04 PROCEDURE — 99215 OFFICE O/P EST HI 40 MIN: CPT | Performed by: INTERNAL MEDICINE

## 2021-05-04 RX ORDER — METHOTREXATE 2.5 MG/1
20 TABLET ORAL
Qty: 96 TAB | Refills: 0 | Status: SHIPPED | OUTPATIENT
Start: 2021-05-10 | End: 2022-02-09

## 2021-05-04 RX ORDER — FOLIC ACID 1 MG/1
1 TABLET ORAL DAILY
Qty: 90 TAB | Refills: 5 | Status: SHIPPED | OUTPATIENT
Start: 2021-05-04

## 2021-05-04 NOTE — LETTER
5/4/2021 Patient: Becki Zhou YOB: 1965 Date of Visit: 5/4/2021 Vinay Wesley MD 
[address] [place] [phone] [address] Fax: [phone] Dear Vinay Wesley MD, Thank you for referring Ms. Becki Zhou to [facility] for evaluation. My notes for this consultation are attached. If you have questions, please do not hesitate to call me. I look forward to following your patient along with you.  
 
 
Sincerely, 
 
Rashmi Joseph MD

## 2021-05-04 NOTE — PROGRESS NOTES
REASON FOR VISIT    This is a follow-up visit for Ms. Sidney Breaux for     ICD-10-CM   1. Seropositive rheumatoid arthritis of multiple sites ([facility]  M05.79     Inflammatory arthritis phenotype includes:  Anti-CCP positive: no  Rheumatoid factor positive: yes (33)  Erosive disease: no  Extra-articular manifestations include: none    Immunosuppression Screening (1/21/2021): Quantiferon TB: negative  PPD:  Not performed  Hepatitis B: negative  Hepatitis C: negative    Therapy History includes:  Current DMARD therapy include: methotrexate 20 mg every Monday (3/08/2021 to present)  Prior DMARD therapy include: hydroxychloroquine 200 mg daily (2015 to 2017, 12/02/2020 to 3/08/2021)  Discontinued DMARDs because of inefficacy: hydroxychloroquine  Discontinued DMARDs because of side effects: None    HISTORY OF PRESENT ILLNESS    Ms. Sidney Breaux returns for a follow-up. On her last visit, I started methotrexate 20 mg every Monday and discontinued HCQ due to lack of benefit, which she has taken with good tolerance. She forgets to take folic acid daily. Today, she feels pretty well. He endorses stiffness in her hands in the morning lasting 15 to 20 minutes without pain or swelling. 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 Her left hand feels worse. She does feel better on methotrexate and now that is warmer. She no longer has pain in her groin when she ambulates or climbs ladders. She knees do bother her after walking. She received her first COVID vaccine.     She denies fever, weight loss, blurred vision, vision loss, oral ulcers, ankle swelling, dry cough, dyspnea, nausea, vomiting, dysphagia, abdominal pain, black or bloody stool, fall since last visit, rash, easy bruising and increased thirst.    Most recent toxicity monitoring by blood work was done on 3/30/2021 and did not reveal any significant adverse effects    Most recent inflammatory markers from 1/21/2021 revealed a ESR 13 mm/hr and CRP <0.29 mg/L. I reviewed the patient's interval medical history, surgical history, medications, and allergies. The patient has not had any interval hospital admissions, infections, or surgeries. REVIEW OF SYSTEMS    A comprehensive review of systems was performed and pertinent results are documented in the HPI, review of systems is otherwise non-contributory. PAST MEDICAL HISTORY    She has a past medical history of Arthritis and Hypertension. FAMILY HISTORY    Her family history includes Arthritis-osteo in her father and mother; Arthritis-rheumatoid in her brother; Diabetes in her father and mother; Gout in her brother and sister; Heart Attack in her father; Other in her father and mother. SOCIAL HISTORY    She reports that she has been smoking. She has been smoking about 0.50 packs per day. She has never used smokeless tobacco. She reports current alcohol use of about 10.0 standard drinks of alcohol per week. She reports that she does not use drugs. MEDICATIONS    Current Outpatient Medications   Medication Sig    [START ON 5/10/2021] methotrexate (RHEUMATREX) 2.5 mg tablet Take 8 Tabs by mouth every Monday.  folic acid (FOLVITE) 1 mg tablet Take 1 Tab by mouth daily.  ergocalciferol (ERGOCALCIFEROL) 1,250 mcg (50,000 unit) capsule Take 1 Cap by mouth every seven (7) days. Indications: vitamin D deficiency (high dose therapy)     No current facility-administered medications for this visit. ALLERGIES    Allergies   Allergen Reactions    Carrot Other (comments)     \"upset stomach\"    Codeine Palpitations       PHYSICAL EXAMINATION    Visit Vitals  BP (!) 154/93   Pulse 73   Temp 97.5 °F (36.4 °C)   Resp 18   Wt 132 lb (59.9 kg)   BMI 22.66 kg/m²     Body mass index is 22.66 kg/m².     General: Patient is alert, oriented x 3, not in acute distress    HEENT:   Sclerae are not injected and appear moist.  There is no alopecia. Neck is supple     HEENT:   Conjunctiva are not injected and appear moist, there is no alopecia. Cardiovascular:  Heart is regular rate and rhythm, no murmurs. Chest:  Lungs are clear to auscultation bilaterally. Extremities:  Free of clubbing, cyanosis, edema, extremities well perfused. Neurological exam:  Muscle strength is full in upper and lower extremities. Skin exam:  There are no rashes, no tophi, no psoriasis, no active Raynaud's, no livedo reticularis, no periungual erythema. Musculoskeletal exam:  A comprehensive musculoskeletal exam was performed for all joints of each upper and lower extremity and assessed for swelling, tenderness and range of motion. Positive results are documented as below:     Bilateral Tanner and Heberden nodes. Bilateral squaring of CMC  Pain in left hip with inward rotation with decreased ROM  Pain in right right with outward rotation with decreased ROM   Bilateral knee crepitus without effusion.   Bilateral MTP squeeze tenderness (RESOLVED)    Z-Deformities:   no  [place] Neck Deformities:  no  Boutonierre's Deformities:  no  Ulnar Deviation:   no  MCP Subluxation:  no     Joint Count 5/4/2021 3/3/2021 12/2/2020   Patient pain (0-100) 30 55 70   MHAQ 0.25 0.375 0.125   Left 1st MCP - Tender - - 0   Left 1st MCP - Swollen - - 1   Left 2nd MCP - Tender - - 1   Left 2nd MCP - Swollen - - 1   Left 4th MCP - Tender - - 1   Left 4th MCP - Swollen - - 0   Left 5th MCP - Tender - - 1   Left 5th MCP - Swollen - - 1   Left thumb IP - Tender - - 1   Left thumb IP - Swollen - - 1   Left 2nd PIP - Tender 1 0 0   Left 2nd PIP - Swollen 1 1 1   Left 3rd PIP - Tender - - 0   Left 3rd PIP - Swollen - - 1   Left 4th PIP - Tender - 1 1   Left 4th PIP - Swollen - 1 1   Left 5th PIP - Tender 1 1 1   Left 5th PIP - Swollen 1 1 1   Right elbow - Tender - - 1   Right elbow - Swollen - - 1   Right wrist- Tender - - 1   Right wrist- Swollen - - 0 Right 1st MCP - Tender 0 - 0   Right 1st MCP - Swollen 1 - 1   Right 5th MCP - Tender - 1 -   Right 5th MCP - Swollen - 0 -   Right thumb IP - Tender - 1 -   Right thumb IP - Swollen - 0 -   Right 2nd PIP - Tender - - 0   Right 2nd PIP - Swollen - - 1   Right 3rd PIP - Tender 1 1 1   Right 3rd PIP - Swollen 1 1 1   Right 4th PIP - Tender 1 1 1   Right 4th PIP - Swollen 1 1 1   Right 5th PIP - Tender 0 1 1   Right 5th PIP - Swollen 1 1 -   Tender Joint Count (Total) 4 7 11   Swollen Joint Count (Total) 6 6 13   Physician Assessment (0-10) 2 - 4   Patient Assessment (0-10) 4 4 5   CDAI Total (calculated) 16 - 35       DATA REVIEW    Laboratory     Recent laboratory results were reviewed, summarized, and discussed with the patient. Imaging    Musculoskeletal Ultrasound    None    Radiographs    Bilateral Hand 2/25/2021: RIGHT: There is no acute fracture or dislocation. There are moderate first carpometacarpal osteoarthritic degenerative changes. There are mild DIP osteoarthritic degenerative changes. Bones are well mineralized. Soft tissues are normal. No osseous erosion is visualized. LEFT: There is no acute fracture or dislocation. There are moderate first carpometacarpal osteoarthritic degenerative changes. Bones are well mineralized. Soft tissues are normal. No osseous erosion is visualized. Bilateral Hip 2/25/2021: no fracture, dislocation or other acute abnormality. Degenerative changes are seen in the hip joints bilaterally.     Bilateral Foot 2/25/2021: RIGHT: No acute fracture or dislocation. Degenerative changes are seen between the first metatarsal head and sesamoid bones. There is mild first MTP osteoarthritis. No erosions. The soft tissues appear unremarkable. LEFT: no fracture or other acute osseous or articular abnormality.  The soft tissues are within normal limits.     CT Imaging    None    MR Imaging    None    DXA     None    ASSESSMENT AND PLAN    This is a follow-up visit for Ms. Lemond Duverney. 1) Seropositive Rheumatoid Arthritis.       She is maintained methotrexate 20 mg every Monday, which she has taken with good tolerance. She has not been taking daily folic acid 1 mg, but will do that. She feels better but she continues to have osteoarthritis pain in her hands and knees. Her CDAI was 16 (previous 33) with 4 tender and 6 swollen joints, consistent with moderate disease activity. She has been on treatment now for almost 8 weeks, so I will continue for now and reassess on follow up. 2) Long Term Use of Immunosuppressants. The patient remains on high risk immunomodulatory medications (methotrexate) and requires frequent toxicity monitoring by blood work to evaluate for toxicities. Respective labs were ordered (see below orders for details).    3) Bilateral Hip Osteoarthritis. 4)  Bilateral Hand/CMCOsteoarthritis. This an active issue mostly in her left hand due to overuse. 5) Vitamin D Deficiency. The patient's vitamin D level was 13.9. I had prescribed weekly ergocalciferol 50,000 but she is not taking it. The patient voiced understanding of the aforementioned assessment and plan. A total of 40 minutes was spent on this visit, reviewing interval notes, interval testing results, ordering tests, refilling medications, documenting the findings in the note, patient education, counseling, and coordination of care as described above. All questions asked and answered.     TODAY'S ORDERS    Orders Placed This Encounter    METHOTREXATE POLYGLUTAMATES    CBC WITH AUTOMATED DIFF    METABOLIC PANEL, COMPREHENSIVE    C REACTIVE PROTEIN, QT    SED RATE (ESR)    VITAMIN D, 25 HYDROXY    methotrexate (RHEUMATREX) 2.5 mg tablet    folic acid (FOLVITE) 1 mg tablet     Future Appointments   Date Time Provider Chidi Latrice   8/10/2021 11:20 AM Aidee Benites MD [facility] BS AMB     Hugo Coronado MD, [address]    Adult Rheumatology   Rheumatology Ultrasound Certified  [address]  [place], [facility], [address]   Phone [phone]  Fax [phone]

## 2021-06-08 NOTE — ADDENDUM NOTE
Addended by: Shauna Baltazar on: 1/21/2021 10:00 AM     Modules accepted: Orders ________________________________________  Medications Phoned  to Pharmacy [] yes [x]no  Name of Pharmacist:  List Medications, including dose, quantity and instructions    Medications ordered this visit were e-scribed.  Verified by order class [] yes  [x] no    Medication changes or discontinuations were communicated to patient's pharmacy: [] yes  [x] no    Dictation completed at time of chart check: [x] yes  [] no    I have checked the documentation for today s encounters and the above information has been reviewed and completed.

## 2021-08-03 ENCOUNTER — APPOINTMENT (OUTPATIENT)
Dept: FAMILY MEDICINE CLINIC | Age: 56
End: 2021-08-03

## 2021-08-10 ENCOUNTER — OFFICE VISIT (OUTPATIENT)
Dept: RHEUMATOLOGY | Age: 56
End: 2021-08-10
Payer: MEDICAID

## 2021-08-10 VITALS
SYSTOLIC BLOOD PRESSURE: 131 MMHG | RESPIRATION RATE: 18 BRPM | BODY MASS INDEX: 22.49 KG/M2 | HEART RATE: 79 BPM | WEIGHT: 131 LBS | DIASTOLIC BLOOD PRESSURE: 88 MMHG | TEMPERATURE: 98.3 F

## 2021-08-10 DIAGNOSIS — M06.09 SERONEGATIVE RHEUMATOID ARTHRITIS OF MULTIPLE SITES (HCC): ICD-10-CM

## 2021-08-10 DIAGNOSIS — Z79.60 LONG-TERM USE OF IMMUNOSUPPRESSANT MEDICATION: ICD-10-CM

## 2021-08-10 DIAGNOSIS — M05.79 SEROPOSITIVE RHEUMATOID ARTHRITIS OF MULTIPLE SITES (HCC): Primary | ICD-10-CM

## 2021-08-10 DIAGNOSIS — E55.9 VITAMIN D DEFICIENCY: ICD-10-CM

## 2021-08-10 DIAGNOSIS — R79.89 LFT ELEVATION: ICD-10-CM

## 2021-08-10 PROCEDURE — 99214 OFFICE O/P EST MOD 30 MIN: CPT | Performed by: INTERNAL MEDICINE

## 2021-08-10 RX ORDER — METHOTREXATE 2.5 MG/1
20 TABLET ORAL
Qty: 96 TABLET | Refills: 0 | Status: CANCELLED | OUTPATIENT
Start: 2021-08-16

## 2021-08-10 RX ORDER — ADALIMUMAB 40MG/0.4ML
40 KIT SUBCUTANEOUS
Qty: 2 KIT | Refills: 11 | Status: SHIPPED | OUTPATIENT
Start: 2021-08-10 | End: 2021-08-11 | Stop reason: SDUPTHER

## 2021-08-10 RX ORDER — FOLIC ACID 1 MG/1
1 TABLET ORAL DAILY
Qty: 90 TABLET | Refills: 5 | Status: CANCELLED | OUTPATIENT
Start: 2021-08-10

## 2021-08-10 NOTE — PROGRESS NOTES
REASON FOR VISIT    This is a follow-up visit for Ms. Jose Armando Corea for     ICD-10-CM   1. Seropositive rheumatoid arthritis of multiple sites ([facility]  M05.79     Inflammatory arthritis phenotype includes:  Anti-CCP positive: no  Rheumatoid factor positive: yes (33)  Erosive disease: no  Extra-articular manifestations include: none    Immunosuppression Screening (1/21/2021): Quantiferon TB: negative  PPD:  Not performed  Hepatitis B: negative  Hepatitis C: negative    Therapy History includes:  Current DMARD therapy include: methotrexate 20 mg every Wednesday (3/08/2021 to present)  Prior DMARD therapy include: hydroxychloroquine 200 mg daily (2015 to 2017, 12/02/2020 to 3/08/2021)  Discontinued DMARDs because of inefficacy: hydroxychloroquine  Discontinued DMARDs because of side effects: None    HISTORY OF PRESENT ILLNESS    Ms. Jose Armando Corea returns for a follow-up. On her last visit, I continued methotrexate 20 mg every Monday, which she has taken with good tolerance. I also ordered labs which were not done. Today, she feels pretty well. He endorses stiffness and swelling in her hands this past week after the weather due to rain but today, she denies pain, swelling, or stiffness in her hands. She does note that about once a week, she may have pain, swelling, and stiffness in her hands lasting up to 2 days. Her knees do bother her after walking. She denies fever, weight loss, blurred vision, vision loss, oral ulcers, ankle swelling, dry cough, dyspnea, nausea, vomiting, dysphagia, abdominal pain, black or bloody stool, fall since last visit, rash, easy bruising and increased thirst.    Most recent toxicity monitoring by blood work was done on 8/03/2021 and did not reveal any significant adverse effects, except , . She drinks about 2 to 3 beers (Coors Light) after work and on her methotrexate dose. I reviewed the patient's interval medical history, surgical history, medications, and allergies.     The patient has not had any interval hospital admissions, infections, or surgeries. REVIEW OF SYSTEMS    A comprehensive review of systems was performed and pertinent results are documented in the HPI, review of systems is otherwise non-contributory. PAST MEDICAL HISTORY    She has a past medical history of Arthritis and Hypertension. FAMILY HISTORY    Her family history includes Arthritis-osteo in her father and mother; Arthritis-rheumatoid in her brother; Diabetes in her father and mother; Gout in her brother and sister; Heart Attack in her father; Other in her father and mother. SOCIAL HISTORY    She reports that she has been smoking. She has been smoking about 0.50 packs per day. She has never used smokeless tobacco. She reports current alcohol use of about 10.0 standard drinks of alcohol per week. She reports that she does not use drugs. MEDICATIONS    Current Outpatient Medications   Medication Sig    adalimumab (Humira,CF, Pen) 40 mg/0.4 mL injection pen 0.4 mL by SubCUTAneous route every fourteen (14) days. Indications: rheumatoid arthritis    methotrexate (RHEUMATREX) 2.5 mg tablet Take 8 Tabs by mouth every Monday.  folic acid (FOLVITE) 1 mg tablet Take 1 Tab by mouth daily.  ergocalciferol (ERGOCALCIFEROL) 1,250 mcg (50,000 unit) capsule Take 1 Cap by mouth every seven (7) days. Indications: vitamin D deficiency (high dose therapy) (Patient not taking: Reported on 8/10/2021)     No current facility-administered medications for this visit. ALLERGIES    Allergies   Allergen Reactions    Carrot Other (comments)     \"upset stomach\"    Codeine Palpitations       PHYSICAL EXAMINATION    Visit Vitals  /88   Pulse 79   Temp 98.3 °F (36.8 °C)   Resp 18   Wt 131 lb (59.4 kg)   BMI 22.49 kg/m²     Body mass index is 22.49 kg/m². General: Patient is alert, oriented x 3, not in acute distress    HEENT:   Sclerae are not injected and appear moist.  There is no alopecia.  Neck is supple HEENT:   Conjunctiva are not injected and appear moist, there is no alopecia. Cardiovascular:  Heart is regular rate and rhythm, no murmurs. Chest:  Lungs are clear to auscultation bilaterally. Extremities:  Free of clubbing, cyanosis, edema, extremities well perfused. Neurological exam:  Muscle strength is full in upper and lower extremities. Skin exam:  There are no rashes, no tophi, no psoriasis, no active Raynaud's, no livedo reticularis, no periungual erythema. Musculoskeletal exam:  A comprehensive musculoskeletal exam was performed for all joints of each upper and lower extremity and assessed for swelling, tenderness and range of motion. Positive results are documented as below:     Bilateral Tanner and Heberden nodes. Bilateral squaring of CMC  Pain in left hip with inward rotation with decreased ROM  Pain in right right with outward rotation with decreased ROM   Bilateral knee crepitus without effusion.   Bilateral MTP squeeze tenderness (RESOLVED)    Z-Deformities:   no  Draper Neck Deformities:  no  Boutonierre's Deformities:  no  Ulnar Deviation:   no  MCP Subluxation:  no     Joint Count 8/10/2021 5/4/2021 3/3/2021 12/2/2020   Patient pain (0-100) 45 30 55 70   MHAQ 0.625 0.25 0.375 0.125   Left elbow - Tender 1 - - -   Left elbow - Swollen 1 - - -   Left 1st MCP - Tender - - - 0   Left 1st MCP - Swollen - - - 1   Left 2nd MCP - Tender - - - 1   Left 2nd MCP - Swollen - - - 1   Left 4th MCP - Tender - - - 1   Left 4th MCP - Swollen - - - 0   Left 5th MCP - Tender - - - 1   Left 5th MCP - Swollen - - - 1   Left thumb IP - Tender - - - 1   Left thumb IP - Swollen - - - 1   Left 2nd PIP - Tender - 1 0 0   Left 2nd PIP - Swollen - 1 1 1   Left 3rd PIP - Tender - - - 0   Left 3rd PIP - Swollen - - - 1   Left 4th PIP - Tender - - 1 1   Left 4th PIP - Swollen - - 1 1   Left 5th PIP - Tender 1 1 1 1   Left 5th PIP - Swollen 1 1 1 1   Right elbow - Tender 1 - - 1   Right elbow - Swollen 1 - - 1   Right wrist- Tender - - - 1   Right wrist- Swollen - - - 0   Right 1st MCP - Tender - 0 - 0   Right 1st MCP - Swollen - 1 - 1   Right 5th MCP - Tender 1 - 1 -   Right 5th MCP - Swollen 0 - 0 -   Right thumb IP - Tender - - 1 -   Right thumb IP - Swollen - - 0 -   Right 2nd PIP - Tender - - - 0   Right 2nd PIP - Swollen - - - 1   Right 3rd PIP - Tender 1 1 1 1   Right 3rd PIP - Swollen 1 1 1 1   Right 4th PIP - Tender 1 1 1 1   Right 4th PIP - Swollen 1 1 1 1   Right 5th PIP - Tender 0 0 1 1   Right 5th PIP - Swollen 1 1 1 -   Tender Joint Count (Total) 6 4 7 11   Swollen Joint Count (Total) 6 6 6 13   Physician Assessment (0-10) 2 2 - 4   Patient Assessment (0-10) 6 4 4 5   CDAI Total (calculated) 20 16 - 35       DATA REVIEW    Laboratory     Recent laboratory results were reviewed, summarized, and discussed with the patient. Imaging    Musculoskeletal Ultrasound    None    Radiographs    Bilateral Hand 2/25/2021: RIGHT: There is no acute fracture or dislocation. There are moderate first carpometacarpal osteoarthritic degenerative changes. There are mild DIP osteoarthritic degenerative changes. Bones are well mineralized. Soft tissues are normal. No osseous erosion is visualized. LEFT: There is no acute fracture or dislocation. There are moderate first carpometacarpal osteoarthritic degenerative changes. Bones are well mineralized. Soft tissues are normal. No osseous erosion is visualized. Bilateral Hip 2/25/2021: no fracture, dislocation or other acute abnormality. Degenerative changes are seen in the hip joints bilaterally.     Bilateral Foot 2/25/2021: RIGHT: No acute fracture or dislocation. Degenerative changes are seen between the first metatarsal head and sesamoid bones. There is mild first MTP osteoarthritis. No erosions. The soft tissues appear unremarkable. LEFT: no fracture or other acute osseous or articular abnormality.  The soft tissues are within normal limits.     CT Imaging    None    MR Imaging    None    DXA     None    ASSESSMENT AND PLAN    This is a follow-up visit for Ms. Howell Adjutant. 1) Seropositive Rheumatoid Arthritis.       She is maintained methotrexate 20 mg every Monday, which she has taken with good tolerance. She reports intermittent flares in her hands that is worse with weather changes but self-limited to 1.5 days. Her CDAI was 20 (previously 16, 33) with 6 tender and 6 swollen joints, consistent with moderate disease activity. She has elevated liver function tests due to methotrexate and/or alcohol consumption, so I would prefer a non-hepatic clearing treatment. I discussed with her about advancing therapy to a biologic (anti-TNF). We discussed the potential adverse effects, which include infections, such as upper respiratory infections, latent TB reactivation, viral hepatitis activation, and routes of administration (oral versus subcutaneous versus infusion). The patient was informed about the low risk for lymphoma based on at least 5 years of post-market data and the likely inherent relation between chronic autoimmune diseases, such as Rheumatoid Arthritis, and lymphoma. The patient was informed these medications co-pay are subject to the patient's insurance coverage and we will not know until it has been submitted to the insurance company. The patient preferred Humira. An order will be submitted today. I asked her to have a repeat liver function tests tomorrow and to hold her methotrexate dose until I review her labs. She had a Coors Light yesterday. 2) Long Term Use of Immunosuppressants. The patient remains on high risk immunomodulatory medications (methotrexate) and requires frequent toxicity monitoring by blood work to evaluate for toxicities. Respective labs were ordered (see below orders for details).    3) Bilateral Hip Osteoarthritis. 4) Bilateral Hand/CMCOsteoarthritis. This an active issue mostly in her left hand due to overuse.       5) Vitamin D Deficiency. The patient's vitamin D level was 13.9. I had prescribed weekly ergocalciferol 50,000 but she is not taking it. 6) Elevated LFTs. See #1. The patient voiced understanding of the aforementioned assessment and plan. A total of 36 minutes was spent on this visit, reviewing interval notes, interval testing results, ordering tests, refilling medications, documenting the findings in the note, patient education, counseling, and coordination of care as described above. All questions asked and answered.     TODAY'S ORDERS    Orders Placed This Encounter    HEPATIC FUNCTION PANEL    adalimumab (Humira,CF, Pen) 40 mg/0.4 mL injection pen     Future Appointments   Date Time Provider Chidi Pollard   8/11/2021 11:00 AM LAB ONLY [facility] BS AMB   11/11/2021 11:00 AM Shade Benites MD [facility] BS AMB     Alicia Stanford MD, [address] [place] [address]    Adult Rheumatology   Rheumatology Ultrasound Certified  [facility]  A [facility] of [facility], [address] [place] [address]   Phone [phone]  Fax [phone]

## 2021-08-10 NOTE — PATIENT INSTRUCTIONS
Please hold methotrexate     Do labs tomorrow    Adalimumab (By injection)   Adalimumab (wy-yw-QZR-[id number]-mab)  Treats arthritis, plaque psoriasis, ankylosing spondylitis, Crohn disease, ulcerative colitis, hidradenitis suppurativa, and uveitis. Brand Name(s): Humira   There may be other brand names for this medicine. When This Medicine Should Not Be Used: This medicine is not right for everyone. Do not use it if you had an allergic reaction to adalimumab. How to Use This Medicine:   Injectable  · Your doctor will prescribe your exact dose and tell you how often it should be given. This medicine is given as a shot under your skin. · A nurse or other health provider will give you this medicine. · You may be taught how to give your medicine at home. Make sure you understand all instructions before giving yourself an injection. Do not use more medicine or use it more often than your doctor tells you to. · You will be shown the body areas where this shot can be given. Use a different body area each time you give yourself a shot. Keep track of where you give each shot to make sure you rotate body areas. Do not inject into skin areas that are red, bruised, tender, or hard. If you have psoriasis, do not inject into a raised, thick, red, or scaly skin patch or into skin lesions. · This medicine should come with a Medication Guide. Ask your pharmacist for a copy if you do not have one. · Missed dose: Take a dose as soon as you remember. If it is almost time for your next dose, wait until then and take a regular dose. Do not take extra medicine to make up for a missed dose. · If you store this medicine at home, keep it in the refrigerator. Do not freeze. Protect the medicine from light. Keep your medicine and supplies in the original packages until you are ready to use them.   Drugs and Foods to Avoid:   Ask your doctor or pharmacist before using any other medicine, including over-the-counter medicines, vitamins, and herbal products. · Some foods and medicines can affect how adalimumab works. Tell your doctor if you are using any of the following:   ¨ Abatacept, anakinra, azathioprine, cyclosporine, mercaptopurine, rituximab, theophylline  ¨ A blood thinner (including warfarin)  ¨ Medicine that weakens the immune system (including a steroid or cancer medicine)  · This medicine may interfere with vaccines. Ask your doctor before you get a flu shot or any other vaccines. Warnings While Using This Medicine:   · Tell your doctor if you are pregnant or breastfeeding, or if you have liver disease, a history of cancer, COPD, heart failure, diabetes, psoriasis, multiple sclerosis, optic neuritis, problems with your immune system, or a history of Guillain-Barré syndrome. Tell your doctor if you have any type of infection (such as hepatitis B or tuberculosis) or an infection that keeps coming back. · This medicine may cause the following problems:   ¨ Increased risk for infection  ¨ Increased risk of certain cancers, such as lymphoma or leukemia  ¨ New or worsening heart failure  · Tell your doctor if you have a latex allergy. The needle cover of the syringe contains latex and may cause allergic reactions. · You will need to have a skin test for tuberculosis (TB) before you start this medicine. Tell your doctor if you or anyone in your home has ever had a positive TB skin test or been exposed to TB. · This medicine may make you bleed, bruise, or get infections more easily. Take precautions to prevent illness and injury. Wash your hands often. · Your doctor will do lab tests at regular visits to check on the effects of this medicine. Keep all appointments. · Throw away used needles in a hard, closed container that the needles cannot poke through. Keep this container away from children and pets. · Keep all medicine out of the reach of children. Never share your medicine with anyone.   Possible Side Effects While Using This Medicine: Call your doctor right away if you notice any of these side effects:  · Allergic reaction: Itching or hives, swelling in your face or hands, swelling or tingling in your mouth or throat, chest tightness, trouble breathing  · Blistering, peeling, red skin rash, or red, scaly patches on the skin  · Change in how much or how often you urinate, painful urination  · Changes in vision  · Chest pain, uneven heartbeat, trouble breathing  · Cough, fever, chills, runny or stuffy nose, sore throat, and body aches  · Dark urine or pale stools, nausea, vomiting, loss of appetite, stomach pain, yellow skin or eyes  · Numbness, tingling, or burning pain in your hands, arms, legs, or feet, or joint pain  · Rapid weight gain, swelling in your hands, ankles, lower legs, or feet  · Sores or white patches on your lips, mouth, or throat  · Swollen glands in your neck, underarms, or groin  · Unusual bleeding, bruising, tiredness, weakness, or weight loss  If you notice these less serious side effects, talk with your doctor:   · Back pain  · Headache  · Redness, itching, bruising, bleeding, pain, or swelling where the shot was given  If you notice other side effects that you think are caused by this medicine, tell your doctor. Call your doctor for medical advice about side effects. You may report side effects to FDA at 0-972-FDA-4740  © 2017 [facility] Information is for End User's use only and may not be sold, redistributed or otherwise used for commercial purposes. The above information is an  only. It is not intended as medical advice for individual conditions or treatments. Talk to your doctor, nurse or pharmacist before following any medical regimen to see if it is safe and effective for you.

## 2021-08-10 NOTE — LETTER
8/10/2021    Patient: Azeem Navarro   YOB: 1965   Date of Visit: 8/10/2021     Dariela Luis MD   [place] [address]  Via Fax: [phone]    Dear Dariela Luis MD,      Thank you for referring Ms. Azeem Navarro to [facility] for evaluation. My notes for this consultation are attached. If you have questions, please do not hesitate to call me. I look forward to following your patient along with you.       Sincerely,    Nazario Donald MD

## 2021-08-11 ENCOUNTER — LAB ONLY (OUTPATIENT)
Dept: FAMILY MEDICINE CLINIC | Age: 56
End: 2021-08-11

## 2021-08-11 DIAGNOSIS — R79.89 LFT ELEVATION: ICD-10-CM

## 2021-08-11 DIAGNOSIS — M05.79 SEROPOSITIVE RHEUMATOID ARTHRITIS OF MULTIPLE SITES (HCC): ICD-10-CM

## 2021-08-11 RX ORDER — ADALIMUMAB 40MG/0.4ML
40 KIT SUBCUTANEOUS
Qty: 2 KIT | Refills: 11 | Status: SHIPPED | OUTPATIENT
Start: 2021-08-11 | End: 2022-02-09

## 2021-08-12 LAB
ALBUMIN SERPL-MCNC: 4.1 G/DL (ref 3.5–5)
ALBUMIN/GLOB SERPL: 1.4 {RATIO} (ref 1.1–2.2)
ALP SERPL-CCNC: 73 U/L (ref 45–117)
ALT SERPL-CCNC: 200 U/L (ref 12–78)
AST SERPL-CCNC: 66 U/L (ref 15–37)
BILIRUB DIRECT SERPL-MCNC: 0.1 MG/DL (ref 0–0.2)
BILIRUB SERPL-MCNC: 0.4 MG/DL (ref 0.2–1)
GLOBULIN SER CALC-MCNC: 2.9 G/DL (ref 2–4)
PROT SERPL-MCNC: 7 G/DL (ref 6.4–8.2)

## 2021-08-12 NOTE — PROGRESS NOTES
The results were reviewed. Elevated liver function tests. This is not due to methotrexate since dose was held --> discuss with PCP.

## 2021-08-13 ENCOUNTER — TELEPHONE (OUTPATIENT)
Dept: RHEUMATOLOGY | Age: 56
End: 2021-08-13

## 2021-08-13 LAB
25(OH)D3+25(OH)D2 SERPL-MCNC: 21.9 NG/ML (ref 30–100)
ALBUMIN SERPL-MCNC: 4.6 G/DL (ref 3.8–4.9)
ALBUMIN/GLOB SERPL: 2 {RATIO} (ref 1.2–2.2)
ALP SERPL-CCNC: 79 IU/L (ref 48–121)
ALT SERPL-CCNC: 149 IU/L (ref 0–32)
AST SERPL-CCNC: 116 IU/L (ref 0–40)
BASOPHILS # BLD AUTO: 0.1 X10E3/UL (ref 0–0.2)
BASOPHILS NFR BLD AUTO: 2 %
BILIRUB SERPL-MCNC: 0.5 MG/DL (ref 0–1.2)
BUN SERPL-MCNC: 8 MG/DL (ref 6–24)
BUN/CREAT SERPL: 12 (ref 9–23)
CALCIUM SERPL-MCNC: 9.6 MG/DL (ref 8.7–10.2)
CHLORIDE SERPL-SCNC: 102 MMOL/L (ref 96–106)
CO2 SERPL-SCNC: 25 MMOL/L (ref 20–29)
CREAT SERPL-MCNC: 0.68 MG/DL (ref 0.57–1)
CRP SERPL-MCNC: 1 MG/L (ref 0–10)
EOSINOPHIL # BLD AUTO: 0.1 X10E3/UL (ref 0–0.4)
EOSINOPHIL NFR BLD AUTO: 2 %
ERYTHROCYTE [DISTWIDTH] IN BLOOD BY AUTOMATED COUNT: 12.8 % (ref 11.7–15.4)
ERYTHROCYTE [SEDIMENTATION RATE] IN BLOOD BY WESTERGREN METHOD: 9 MM/HR (ref 0–40)
GLOBULIN SER CALC-MCNC: 2.3 G/DL (ref 1.5–4.5)
GLUCOSE SERPL-MCNC: 111 MG/DL (ref 65–99)
HCT VFR BLD AUTO: 41.4 % (ref 34–46.6)
HGB BLD-MCNC: 14.2 G/DL (ref 11.1–15.9)
IMM GRANULOCYTES # BLD AUTO: 0 X10E3/UL (ref 0–0.1)
IMM GRANULOCYTES NFR BLD AUTO: 1 %
LYMPHOCYTES # BLD AUTO: 1.3 X10E3/UL (ref 0.7–3.1)
LYMPHOCYTES NFR BLD AUTO: 24 %
MCH RBC QN AUTO: 33.7 PG (ref 26.6–33)
MCHC RBC AUTO-ENTMCNC: 34.3 G/DL (ref 31.5–35.7)
MCV RBC AUTO: 98 FL (ref 79–97)
METHOTREXATE PG1: 114 NMOL/L RBC
METHOTREXATE PG2: 54 NMOL/L RBC
METHOTREXATE PG3: 102 NMOL/L RBC
METHOTREXATE PG4: 36 NMOL/L RBC
METHOTREXATE PG5: 11 NMOL/L RBC
METHOTREXATE-PG TOTAL: 316 NMOL/L RBC
MONOCYTES # BLD AUTO: 0.4 X10E3/UL (ref 0.1–0.9)
MONOCYTES NFR BLD AUTO: 8 %
MTXPGSRA INTERPRETATION: NORMAL
NEUTROPHILS # BLD AUTO: 3.5 X10E3/UL (ref 1.4–7)
NEUTROPHILS NFR BLD AUTO: 63 %
PLATELET # BLD AUTO: 290 X10E3/UL (ref 150–450)
POTASSIUM SERPL-SCNC: 4.9 MMOL/L (ref 3.5–5.2)
PROT SERPL-MCNC: 6.9 G/DL (ref 6–8.5)
RBC # BLD AUTO: 4.21 X10E6/UL (ref 3.77–5.28)
SODIUM SERPL-SCNC: 141 MMOL/L (ref 134–144)
WBC # BLD AUTO: 5.5 X10E3/UL (ref 3.4–10.8)

## 2021-08-13 NOTE — TELEPHONE ENCOUNTER
----- Message from Stacie Capps sent at 8/13/2021 11:20 AM EDT -----  Regarding: Dr. Ángel Way Message/Vendor Calls    Caller's first and last name:Clara([facility])      Reason for call:list of pt's allergies      Callback required yes/no and why:no      Best contact number(s):[phone] (fax) [phone]      Details to clarify the request:Clara requested a list of pt's allergy information.       Stacie Capps

## 2021-08-15 DIAGNOSIS — R79.89 LFT ELEVATION: Primary | ICD-10-CM

## 2021-08-15 NOTE — PROGRESS NOTES
The results were reviewed. Elevated liver function tests (, ) --> need to repeat. Low vitamin D 21.9 --> continue weekly ergocalciferol vitamin D or daily 5000.

## 2021-08-20 LAB
25(OH)D3+25(OH)D2 SERPL-MCNC: 23.9 NG/ML (ref 30–100)
ALBUMIN SERPL-MCNC: 4.5 G/DL (ref 3.8–4.9)
ALBUMIN/GLOB SERPL: 2 {RATIO} (ref 1.2–2.2)
ALP SERPL-CCNC: 75 IU/L (ref 48–121)
ALT SERPL-CCNC: 153 IU/L (ref 0–32)
AST SERPL-CCNC: 61 IU/L (ref 0–40)
BASOPHILS # BLD AUTO: 0.1 X10E3/UL (ref 0–0.2)
BASOPHILS NFR BLD AUTO: 2 %
BILIRUB SERPL-MCNC: 0.3 MG/DL (ref 0–1.2)
BUN SERPL-MCNC: 15 MG/DL (ref 6–24)
BUN/CREAT SERPL: 20 (ref 9–23)
CALCIUM SERPL-MCNC: 9.5 MG/DL (ref 8.7–10.2)
CHLORIDE SERPL-SCNC: 101 MMOL/L (ref 96–106)
CO2 SERPL-SCNC: 24 MMOL/L (ref 20–29)
CREAT SERPL-MCNC: 0.74 MG/DL (ref 0.57–1)
CRP SERPL-MCNC: 1 MG/L (ref 0–10)
EOSINOPHIL # BLD AUTO: 0.1 X10E3/UL (ref 0–0.4)
EOSINOPHIL NFR BLD AUTO: 2 %
ERYTHROCYTE [DISTWIDTH] IN BLOOD BY AUTOMATED COUNT: 13.6 % (ref 11.7–15.4)
ERYTHROCYTE [SEDIMENTATION RATE] IN BLOOD BY WESTERGREN METHOD: 13 MM/HR (ref 0–40)
GLOBULIN SER CALC-MCNC: 2.2 G/DL (ref 1.5–4.5)
GLUCOSE SERPL-MCNC: 119 MG/DL (ref 65–99)
HCT VFR BLD AUTO: 42.6 % (ref 34–46.6)
HGB BLD-MCNC: 13.6 G/DL (ref 11.1–15.9)
IMM GRANULOCYTES # BLD AUTO: 0.1 X10E3/UL (ref 0–0.1)
IMM GRANULOCYTES NFR BLD AUTO: 1 %
LYMPHOCYTES # BLD AUTO: 1.6 X10E3/UL (ref 0.7–3.1)
LYMPHOCYTES NFR BLD AUTO: 32 %
MCH RBC QN AUTO: 32.6 PG (ref 26.6–33)
MCHC RBC AUTO-ENTMCNC: 31.9 G/DL (ref 31.5–35.7)
MCV RBC AUTO: 102 FL (ref 79–97)
METHOTREXATE PG1: 152 NMOL/L RBC
METHOTREXATE PG2: 65 NMOL/L RBC
METHOTREXATE PG3: 157 NMOL/L RBC
METHOTREXATE PG4: 48 NMOL/L RBC
METHOTREXATE PG5: 12 NMOL/L RBC
METHOTREXATE-PG TOTAL: 434 NMOL/L RBC
MONOCYTES # BLD AUTO: 0.5 X10E3/UL (ref 0.1–0.9)
MONOCYTES NFR BLD AUTO: 10 %
MTXPGSRA INTERPRETATION: NORMAL
NEUTROPHILS # BLD AUTO: 2.7 X10E3/UL (ref 1.4–7)
NEUTROPHILS NFR BLD AUTO: 53 %
PLATELET # BLD AUTO: 258 X10E3/UL (ref 150–450)
POTASSIUM SERPL-SCNC: 5.1 MMOL/L (ref 3.5–5.2)
PROT SERPL-MCNC: 6.7 G/DL (ref 6–8.5)
RBC # BLD AUTO: 4.17 X10E6/UL (ref 3.77–5.28)
SODIUM SERPL-SCNC: 138 MMOL/L (ref 134–144)
WBC # BLD AUTO: 5 X10E3/UL (ref 3.4–10.8)

## 2021-11-11 ENCOUNTER — LAB ONLY (OUTPATIENT)
Dept: FAMILY MEDICINE CLINIC | Age: 56
End: 2021-11-11

## 2021-11-11 ENCOUNTER — OFFICE VISIT (OUTPATIENT)
Dept: RHEUMATOLOGY | Age: 56
End: 2021-11-11
Payer: MEDICAID

## 2021-11-11 VITALS
TEMPERATURE: 98.1 F | HEART RATE: 75 BPM | DIASTOLIC BLOOD PRESSURE: 87 MMHG | WEIGHT: 132 LBS | RESPIRATION RATE: 18 BRPM | SYSTOLIC BLOOD PRESSURE: 141 MMHG | BODY MASS INDEX: 22.66 KG/M2

## 2021-11-11 DIAGNOSIS — Z79.60 LONG-TERM USE OF IMMUNOSUPPRESSANT MEDICATION: ICD-10-CM

## 2021-11-11 DIAGNOSIS — M05.79 SEROPOSITIVE RHEUMATOID ARTHRITIS OF MULTIPLE SITES (HCC): Primary | ICD-10-CM

## 2021-11-11 DIAGNOSIS — M05.79 SEROPOSITIVE RHEUMATOID ARTHRITIS OF MULTIPLE SITES (HCC): ICD-10-CM

## 2021-11-11 LAB
ALBUMIN SERPL-MCNC: 4.2 G/DL (ref 3.5–5)
ALBUMIN/GLOB SERPL: 1.3 {RATIO} (ref 1.1–2.2)
ALP SERPL-CCNC: 66 U/L (ref 45–117)
ALT SERPL-CCNC: 24 U/L (ref 12–78)
ANION GAP SERPL CALC-SCNC: 3 MMOL/L (ref 5–15)
AST SERPL-CCNC: 14 U/L (ref 15–37)
BASOPHILS # BLD: 0.1 K/UL (ref 0–0.1)
BASOPHILS NFR BLD: 2 % (ref 0–1)
BILIRUB SERPL-MCNC: 0.5 MG/DL (ref 0.2–1)
BUN SERPL-MCNC: 8 MG/DL (ref 6–20)
BUN/CREAT SERPL: 12 (ref 12–20)
CALCIUM SERPL-MCNC: 9.7 MG/DL (ref 8.5–10.1)
CHLORIDE SERPL-SCNC: 109 MMOL/L (ref 97–108)
CO2 SERPL-SCNC: 27 MMOL/L (ref 21–32)
CREAT SERPL-MCNC: 0.69 MG/DL (ref 0.55–1.02)
DIFFERENTIAL METHOD BLD: ABNORMAL
EOSINOPHIL # BLD: 0.1 K/UL (ref 0–0.4)
EOSINOPHIL NFR BLD: 2 % (ref 0–7)
ERYTHROCYTE [DISTWIDTH] IN BLOOD BY AUTOMATED COUNT: 12.6 % (ref 11.5–14.5)
GLOBULIN SER CALC-MCNC: 3.2 G/DL (ref 2–4)
GLUCOSE SERPL-MCNC: 106 MG/DL (ref 65–100)
HCT VFR BLD AUTO: 44.8 % (ref 35–47)
HGB BLD-MCNC: 14.7 G/DL (ref 11.5–16)
IMM GRANULOCYTES # BLD AUTO: 0.1 K/UL (ref 0–0.04)
IMM GRANULOCYTES NFR BLD AUTO: 1 % (ref 0–0.5)
LYMPHOCYTES # BLD: 1.6 K/UL (ref 0.8–3.5)
LYMPHOCYTES NFR BLD: 31 % (ref 12–49)
MCH RBC QN AUTO: 31.9 PG (ref 26–34)
MCHC RBC AUTO-ENTMCNC: 32.8 G/DL (ref 30–36.5)
MCV RBC AUTO: 97.2 FL (ref 80–99)
MONOCYTES # BLD: 0.4 K/UL (ref 0–1)
MONOCYTES NFR BLD: 8 % (ref 5–13)
NEUTS SEG # BLD: 2.8 K/UL (ref 1.8–8)
NEUTS SEG NFR BLD: 56 % (ref 32–75)
NRBC # BLD: 0 K/UL (ref 0–0.01)
NRBC BLD-RTO: 0 PER 100 WBC
PLATELET # BLD AUTO: 240 K/UL (ref 150–400)
PMV BLD AUTO: 10.2 FL (ref 8.9–12.9)
POTASSIUM SERPL-SCNC: 4.9 MMOL/L (ref 3.5–5.1)
PROT SERPL-MCNC: 7.4 G/DL (ref 6.4–8.2)
RBC # BLD AUTO: 4.61 M/UL (ref 3.8–5.2)
SODIUM SERPL-SCNC: 139 MMOL/L (ref 136–145)
URATE SERPL-MCNC: 6.6 MG/DL (ref 2.6–6)
WBC # BLD AUTO: 5.1 K/UL (ref 3.6–11)

## 2021-11-11 PROCEDURE — 99214 OFFICE O/P EST MOD 30 MIN: CPT | Performed by: INTERNAL MEDICINE

## 2021-11-11 RX ORDER — ADALIMUMAB 40MG/0.4ML
40 KIT SUBCUTANEOUS
Qty: 1 KIT | Refills: 0 | Status: SHIPPED | COMMUNITY
Start: 2021-11-11 | End: 2022-02-09

## 2021-11-11 NOTE — LETTER
11/11/2021    Patient: Georgia Wheatley   YOB: 1965   Date of Visit: 11/11/2021     Eli Valente MD  [phone]  Via Fax: [phone]    Dear Eli Valente MD,      Thank you for referring Ms. Georgia Wheatley to [facility] for evaluation. My notes for this consultation are attached. If you have questions, please do not hesitate to call me. I look forward to following your patient along with you.       Sincerely,    Dannie Arthur MD

## 2021-11-11 NOTE — PROGRESS NOTES
REASON FOR VISIT    This is a follow-up visit for Ms. Pito Mejia for     ICD-10-CM   1. Seropositive rheumatoid arthritis of multiple sites ([facility]  M05.79     Inflammatory arthritis phenotype includes:  Anti-CCP positive: no  Rheumatoid factor positive: yes (33)  Erosive disease: no  Extra-articular manifestations include: none    Immunosuppression Screening (1/21/2021): Quantiferon TB: negative  PPD:  Not performed  Hepatitis B: negative  Hepatitis C: negative    Therapy History includes:  Current DMARD therapy include: Humria 40 mg every 14 days (11/11/2021; SAMPLE)  Prior DMARD therapy include: hydroxychloroquine 200 mg daily (2015 to 2017, 12/02/2020 to 3/08/2021), methotrexate 20 mg every Wednesday (3/08/2021 to 8/10/2021; stopped due to LFT elevated EtOH vs MTX)  Discontinued DMARDs because of inefficacy: hydroxychloroquine  Discontinued DMARDs because of side effects: None    HISTORY OF PRESENT ILLNESS    Ms. Pito Mejia returns for a follow-up. On her last visit, due to elevated liver function tests (alcohol) I asked her to hold methotrexate 20 mg every Monday and I started Humira 40 mg every 14 days, which she has taken with good tolerance. I also ordered labs which showed persistently elevated liver function tests. She has not started Humira due to refrigeration issues that has been unresolved. Today, she feels pretty good. She felt worse when it was cold. She notes pain that is throbbing in her feet after working that lasts until she rests and props them up. She denies fever, weight loss, blurred vision, vision loss, oral ulcers, ankle swelling, dry cough, dyspnea, nausea, vomiting, dysphagia, abdominal pain, black or bloody stool, fall since last visit, rash, easy bruising and increased thirst.    Most recent toxicity monitoring by blood work was done on 8/03/2021 and did not reveal any significant adverse effects, except , AST 61.  She drinks about 2 to 3 beers (Coors Light) after work and on her methotrexate dose. I reviewed the patient's interval medical history, surgical history, medications, and allergies. The patient has not had any interval hospital admissions, infections, or surgeries. REVIEW OF SYSTEMS    A comprehensive review of systems was performed and pertinent results are documented in the HPI, review of systems is otherwise non-contributory. PAST MEDICAL HISTORY    She has a past medical history of Arthritis and Hypertension. FAMILY HISTORY    Her family history includes Arthritis-osteo in her father and mother; Arthritis-rheumatoid in her brother; Diabetes in her father and mother; Gout in her brother and sister; Heart Attack in her father; Other in her father and mother. SOCIAL HISTORY    She reports that she has been smoking. She has been smoking about 0.50 packs per day. She has never used smokeless tobacco. She reports current alcohol use of about 10.0 standard drinks of alcohol per week. She reports that she does not use drugs. MEDICATIONS    Current Outpatient Medications   Medication Sig    adalimumab (Humira,CF, Pen) 40 mg/0.4 mL injection pen 0.4 mL by SubCUTAneous route every fourteen (14) days. Indications: rheumatoid arthritis    adalimumab (Humira,CF, Pen) 40 mg/0.4 mL injection pen 0.4 mL by SubCUTAneous route every fourteen (14) days. Indications: rheumatoid arthritis (Patient not taking: Reported on 11/11/2021)    methotrexate (RHEUMATREX) 2.5 mg tablet Take 8 Tabs by mouth every Monday. (Patient not taking: Reported on 41/23/6122)    folic acid (FOLVITE) 1 mg tablet Take 1 Tab by mouth daily. (Patient not taking: Reported on 11/11/2021)    ergocalciferol (ERGOCALCIFEROL) 1,250 mcg (50,000 unit) capsule Take 1 Cap by mouth every seven (7) days. Indications: vitamin D deficiency (high dose therapy) (Patient not taking: Reported on 8/10/2021)     No current facility-administered medications for this visit.      ALLERGIES    Allergies   Allergen Reactions  Carrot Other (comments)     \"upset stomach\"    Codeine Palpitations       PHYSICAL EXAMINATION    Visit Vitals  BP (!) 141/87   Pulse 75   Temp 98.1 °F (36.7 °C)   Resp 18   Wt 132 lb (59.9 kg)   BMI 22.66 kg/m²     Body mass index is 22.66 kg/m². General: Patient is alert, oriented x 3, not in acute distress    HEENT:   Sclerae are not injected and appear moist.  There is no alopecia. Chest:  Breathing comfortably at room air    Musculoskeletal exam:  A comprehensive musculoskeletal exam was NOT performed for all joints of each upper and lower extremity and assessed for swelling, tenderness and range of motion. Positive results are documented as below:     Previous exam    Bilateral Tanner and Heberden nodes. Bilateral squaring of CMC  Pain in left hip with inward rotation with decreased ROM  Pain in right right with outward rotation with decreased ROM   Bilateral knee crepitus without effusion.   Bilateral MTP squeeze tenderness (RESOLVED)    Z-Deformities:   no  Radford Neck Deformities:  no  Boutonierre's Deformities:  no  Ulnar Deviation:   no  MCP Subluxation:  no     Joint Count 11/11/2021 8/10/2021 5/4/2021 3/3/2021 12/2/2020   Patient pain (0-100) 40 45 30 55 70   MHAQ 0.75 0.625 0.25 0.375 0.125   Left elbow - Tender - 1 - - -   Left elbow - Swollen - 1 - - -   Left 1st MCP - Tender - - - - 0   Left 1st MCP - Swollen - - - - 1   Left 2nd MCP - Tender - - - - 1   Left 2nd MCP - Swollen - - - - 1   Left 4th MCP - Tender - - - - 1   Left 4th MCP - Swollen - - - - 0   Left 5th MCP - Tender - - - - 1   Left 5th MCP - Swollen - - - - 1   Left thumb IP - Tender - - - - 1   Left thumb IP - Swollen - - - - 1   Left 2nd PIP - Tender - - 1 0 0   Left 2nd PIP - Swollen - - 1 1 1   Left 3rd PIP - Tender - - - - 0   Left 3rd PIP - Swollen - - - - 1   Left 4th PIP - Tender - - - 1 1   Left 4th PIP - Swollen - - - 1 1   Left 5th PIP - Tender - 1 1 1 1   Left 5th PIP - Swollen - 1 1 1 1   Right elbow - Tender - 1 - - 1   Right elbow - Swollen - 1 - - 1   Right wrist- Tender - - - - 1   Right wrist- Swollen - - - - 0   Right 1st MCP - Tender - - 0 - 0   Right 1st MCP - Swollen - - 1 - 1   Right 5th MCP - Tender - 1 - 1 -   Right 5th MCP - Swollen - 0 - 0 -   Right thumb IP - Tender - - - 1 -   Right thumb IP - Swollen - - - 0 -   Right 2nd PIP - Tender - - - - 0   Right 2nd PIP - Swollen - - - - 1   Right 3rd PIP - Tender - 1 1 1 1   Right 3rd PIP - Swollen - 1 1 1 1   Right 4th PIP - Tender - 1 1 1 1   Right 4th PIP - Swollen - 1 1 1 1   Right 5th PIP - Tender - 0 0 1 1   Right 5th PIP - Swollen - 1 1 1 -   Tender Joint Count (Total) - 6 4 7 11   Swollen Joint Count (Total) - 6 6 6 13   Physician Assessment (0-10) - 2 2 - 4   Patient Assessment (0-10) 5 6 4 4 5   CDAI Total (calculated) - 20 16 - 35       DATA REVIEW    Laboratory     Recent laboratory results were reviewed, summarized, and discussed with the patient. Imaging    Musculoskeletal Ultrasound    None    Radiographs    Bilateral Hand 2/25/2021: RIGHT: There is no acute fracture or dislocation. There are moderate first carpometacarpal osteoarthritic degenerative changes. There are mild DIP osteoarthritic degenerative changes. Bones are well mineralized. Soft tissues are normal. No osseous erosion is visualized. LEFT: There is no acute fracture or dislocation. There are moderate first carpometacarpal osteoarthritic degenerative changes. Bones are well mineralized. Soft tissues are normal. No osseous erosion is visualized. Bilateral Hip 2/25/2021: no fracture, dislocation or other acute abnormality. Degenerative changes are seen in the hip joints bilaterally.     Bilateral Foot 2/25/2021: RIGHT: No acute fracture or dislocation. Degenerative changes are seen between the first metatarsal head and sesamoid bones. There is mild first MTP osteoarthritis. No erosions. The soft tissues appear unremarkable.  LEFT: no fracture or other acute osseous or articular abnormality. The soft tissues are within normal limits.     CT Imaging    None    MR Imaging    None    DXA     None    ASSESSMENT AND PLAN    This is a follow-up visit for Ms. Cesilia Phan. 1) Seropositive Rheumatoid Arthritis.       She was maintained methotrexate 20 mg every Monday but due to elevated liver function tests (EtOH >> methotrexate), it was discontinued. I started Humira 40 mg every 14 days, which was approved and she received treatment but due to refrigeration issues, discarded and has not started. I gave her a sample with dose, with teaching today and asked her to contact [facility] to schedule delivery. She reports intermittent flares in her hands that is worse with weather changes but self-limited to 1.5 days. She did feel worse being off methotrexate. Her CDAI was previously 20 (previously 16, 33) with 6 tender and 6 swollen joints, consistent with moderate disease activity. She has elevated liver function tests due to methotrexate versus alcohol consumption, so I would prefer a non-hepatic clearing treatment. I will repeat her liver function tests. 2) Long Term Use of Immunosuppressants. The patient will initiate on high risk immunomodulatory medications (Humira) and requires frequent toxicity monitoring by blood work to evaluate for toxicities.    3) Bilateral Hip Osteoarthritis. 4) Bilateral Hand/CMCOsteoarthritis. This an active issue mostly in her left hand due to overuse. 5) Vitamin D Deficiency. The patient's vitamin D level was 23.9 (previously 21.9, 13.9). I had prescribed weekly ergocalciferol 50,000 but she is not taking it. 6) Elevated LFTs. I will repeat today off methotrexate. She reports drinking less. The patient voiced understanding of the aforementioned assessment and plan.       A total of 31 minutes was spent on this visit, reviewing interval notes, interval testing results, ordering tests, refilling medications, documenting the findings in the note, patient education, counseling, and coordination of care as described above. All questions asked and answered.     TODAY'S ORDERS    Orders Placed This Encounter    CBC WITH AUTOMATED DIFF    METABOLIC PANEL, COMPREHENSIVE    URIC ACID    adalimumab (Humira,CF, Pen) 40 mg/0.4 mL injection pen     Future Appointments   Date Time Provider Chidi Latrice   2/9/2022 11:40 AM Kaden Johnson MD AO[facility] BS AMB     Jacinto Haider MD, [address] [facility]    Adult Rheumatology   Rheumatology Ultrasound Certified  [facility]  [facility], [address] [place] [address]   Phone [phone]  Fax [phone]

## 2022-02-09 ENCOUNTER — OFFICE VISIT (OUTPATIENT)
Dept: RHEUMATOLOGY | Age: 57
End: 2022-02-09
Payer: MEDICAID

## 2022-02-09 VITALS
DIASTOLIC BLOOD PRESSURE: 88 MMHG | RESPIRATION RATE: 20 BRPM | TEMPERATURE: 98.2 F | HEIGHT: 64 IN | SYSTOLIC BLOOD PRESSURE: 152 MMHG | HEART RATE: 72 BPM | BODY MASS INDEX: 23.22 KG/M2 | OXYGEN SATURATION: 98 % | WEIGHT: 136 LBS

## 2022-02-09 DIAGNOSIS — Z79.60 LONG-TERM USE OF IMMUNOSUPPRESSANT MEDICATION: ICD-10-CM

## 2022-02-09 DIAGNOSIS — L40.50 PSORIATIC ARTHRITIS (HCC): Primary | ICD-10-CM

## 2022-02-09 PROCEDURE — 99215 OFFICE O/P EST HI 40 MIN: CPT | Performed by: INTERNAL MEDICINE

## 2022-02-09 RX ORDER — APREMILAST 30 MG
1 KIT ORAL SEE ADMIN INSTRUCTIONS
Qty: 27 TABLET | Refills: 0 | Status: SHIPPED | OUTPATIENT
Start: 2022-02-09

## 2022-02-09 RX ORDER — APREMILAST 30 MG/1
30 TABLET, FILM COATED ORAL 2 TIMES DAILY
Qty: 60 TABLET | Refills: 11 | Status: SHIPPED | OUTPATIENT
Start: 2022-02-09

## 2022-02-09 NOTE — PATIENT INSTRUCTIONS
1. I'm applying for Otezla (apremilast) to take for your rash and joint pain, which I think are more reflective of psoriatic arthritis than rheumatoid arthritis. You'll get a starter pack when we first get this approved, which will step you through the increases to the goal dose of 30mg twice a day. Take these with food. [place] Gopi can cause diarrhea, stomach upset, and rarely can worsen depression, so stop taking it and let me know if you have a problem with any of these. 2. For breakthrough pain, OK to take ibuprofen 400-600mg ideally not more than twice a day a few days out of the week. Arthritis compression gloves (available from your local pharmacy, or [employer]) such as Copperfit can be helpful for pain with activity. Topical Voltaren gel (diclofenac 1%) can be helpful for small joints as well, apply as needed up to 4x/day. 3. Labs ASAP    4. Return in 3-4 months.

## 2022-02-09 NOTE — LETTER
2022    Patient: Margot Jackson   YOB: 1965   Date of Visit: 2022     Aldon Apgar, MD  [address] [address]  Via Fax: [phone]    Dear Aldon Apgar, MD,    We recently saw Ms. Margot Jackson in the [facility] for evaluation. My notes for this consultation are attached. If you have questions, please do not hesitate to call me. I look forward to following your patient along with you.       Sincerely,    Dayne Celaya MD [facility]  Cell: [phone]

## 2022-02-09 NOTE — PROGRESS NOTES
Chief Complaint   Patient presents with    Arthritis     knees, hips, ankles     1. Have you been to the ER, urgent care clinic since your last visit? Hospitalized since your last visit? No    2. Have you seen or consulted any other health care providers outside of the [address] [place] [address] since your last visit? Include any pap smears or colon screening.  No

## 2022-02-09 NOTE — PROGRESS NOTES
REASON FOR VISIT    This is a follow-up visit for Ms. Reta Hong for     ICD-10-CM   1. Seropositive rheumatoid arthritis of multiple sites ([facility].)  M05.79     Inflammatory arthritis phenotype includes:  Anti-CCP positive: no  Rheumatoid factor positive: yes (33)  Erosive disease: no  Extra-articular manifestations include: none    Immunosuppression Screening (1/21/2021): Quantiferon TB: negative  PPD:  Not performed  Hepatitis B: negative  Hepatitis C: negative    Therapy History includes:  Current DMARD therapy include: Humia 40 mg every 14 days (11/11/2021; SAMPLE)  Prior DMARD therapy include: hydroxychloroquine 200 mg daily (2015 to 2017, 12/02/2020 to 3/08/2021), methotrexate 20 mg every Wednesday (3/08/2021 to 8/10/2021; stopped due to LFT elevated EtOH vs MTX)  Discontinued DMARDs because of inefficacy: hydroxychloroquine  Discontinued DMARDs because of side effects: None    HISTORY OF PRESENT ILLNESS    Ms. Reta Hong returns for a follow-up. Had taken about 3 doses of the Humira, but didn't continue this due to inconsistent power in her refrigerator at home. Last injection was late November/early December. Brother has plaque psoriasis, she has been getting plaques over the eyebrows over the last month. More sore today, swelling in hands. Right 1st MTP and also tenderness between 1st two toes she suspects is a neuroma. Mild paralumbar tenderness and stiffness if she sits for too long. Ibuprofen works well for breakthrough pain, will take a 1-time     Notes family history of gout in men of her family. Has been diagnosed with vitamin D deficiency previously. Estimates on average she is drinking 1-2 beers/night. REVIEW OF SYSTEMS    A comprehensive review of systems was performed and pertinent results are documented in the HPI, review of systems is otherwise non-contributory. PAST MEDICAL HISTORY    She has a past medical history of Arthritis and Hypertension.     FAMILY HISTORY    Her family history includes Arthritis-rheumatoid in her brother; Diabetes in her father and mother; Gout in her brother and sister; Heart Attack in her father; OSTEOARTHRITIS in her father and mother; Other in her father and mother. SOCIAL HISTORY    She reports that she has been smoking. She has been smoking about 0.50 packs per day. She has never used smokeless tobacco. She reports current alcohol use of about 10.0 standard drinks of alcohol per week. She reports that she does not use drugs. MEDICATIONS    Current Outpatient Medications   Medication Sig    adalimumab (Humira,CF, Pen) 40 mg/0.4 mL injection pen 0.4 mL by SubCUTAneous route every fourteen (14) days. Indications: rheumatoid arthritis (Patient not taking: Reported on 2/9/2022)    adalimumab (Humira,CF, Pen) 40 mg/0.4 mL injection pen 0.4 mL by SubCUTAneous route every fourteen (14) days. Indications: rheumatoid arthritis (Patient not taking: Reported on 11/11/2021)    methotrexate (RHEUMATREX) 2.5 mg tablet Take 8 Tabs by mouth every Monday. (Patient not taking: Reported on 16/60/8870)    folic acid (FOLVITE) 1 mg tablet Take 1 Tab by mouth daily. (Patient not taking: Reported on 11/11/2021)    ergocalciferol (ERGOCALCIFEROL) 1,250 mcg (50,000 unit) capsule Take 1 Cap by mouth every seven (7) days. Indications: vitamin D deficiency (high dose therapy) (Patient not taking: Reported on 8/10/2021)     No current facility-administered medications for this visit. ALLERGIES    Allergies   Allergen Reactions    Carrot Other (comments)     \"upset stomach\"    Codeine Palpitations       PHYSICAL EXAMINATION    Visit Vitals  BP (!) 152/88 (BP 1 Location: Left upper arm)   Pulse 72   Temp 98.2 °F (36.8 °C) (Oral)   Resp 20   Ht 5' 4\" (1.626 m)   Wt 136 lb (61.7 kg)   SpO2 98%   BMI 23.34 kg/m²     Body mass index is 23.34 kg/m².   mHAQ 0.375  Patient pain: 60/100  Patient global: 60/100  MD global: 3/10    General:  The patient is well developed, well nourished, alert, and in no apparent distress. Eyes: Sclera are anicteric. No conjunctival injection. HEENT:  Oropharynx is clear. No oral ulcers. Adequate salivary pooling. No cervical or supraclavicular lymphadenopathy. Lungs:  Clear to auscultation bilaterally, without wheeze or stridor. Normal respiratory effort. Cor:  Regular rate and rhythm. No murmur rub or gallop. Abdomen: Soft, non-tender, without hepatomegaly or masses. Extremities: No calf tenderness or edema. Warm and well perfused. Skin: Linear plaques over glabella and eyebrows   Neuro: Nonfocal, no foot or wrist drop  Musculoskeletal:    A comprehensive musculoskeletal exam was performed for all joints of each upper and lower extremity and assessed for swelling, tenderness and range of motion. Results are documented as below:  High arches   Prominent quin and heberden nodes, no lon synovitis today  Bilateral groin tenderness with passive hip rotation  Bilateral knee crepitus  Tenderness between right 2nd and 3rd mtps  No evidence of synovitis in the small joints of the hands, wrists, shoulders, elbows, hips, knees or ankles. DATA REVIEW    Laboratory     Recent laboratory results were reviewed, summarized, and discussed with the patient. 11/11/21: CBC WNL, Cr 0.69, LFTs WNL, uric acid 6.6  8/11/21: Vit D 23.9    Imaging    Musculoskeletal Ultrasound    None    Radiographs    Bilateral Hand 2/25/2021: RIGHT: There is no acute fracture or dislocation. There are moderate first carpometacarpal osteoarthritic degenerative changes. There are mild DIP osteoarthritic degenerative changes. Bones are well mineralized. Soft tissues are normal. No osseous erosion is visualized. LEFT: There is no acute fracture or dislocation. There are moderate first carpometacarpal osteoarthritic degenerative changes. Bones are well mineralized. Soft tissues are normal. No osseous erosion is visualized.     Bilateral Hip 2/25/2021: no fracture, dislocation or other acute abnormality. Degenerative changes are seen in the hip joints bilaterally.     Bilateral Foot 2/25/2021: RIGHT: No acute fracture or dislocation. Degenerative changes are seen between the first metatarsal head and sesamoid bones. There is mild first MTP osteoarthritis. No erosions. The soft tissues appear unremarkable. LEFT: no fracture or other acute osseous or articular abnormality. The soft tissues are within normal limits.     CT Imaging    None    MR Imaging    None    DXA     None    ASSESSMENT AND PLAN    This is a follow-up visit for Ms. Jesse Steven for psoriatic arthritis manifest with facial psoriaform plaques, with moderate disease activity now off of treatment after transaminitis with methotrexate, and inability to use Humira due to unavailability of a reliable refrigerator. Would avoid leflunomide due to risk of recurrent liver injury and still moderate alcohol consumption, and unable to treat with Enbrel, Humira, or injection-type biologics due to their need for reliable refrigeration. 1. Psoriatic arthritis (HCC)  - QUANTIFERON-TB GOLD PLUS; Future  - C REACTIVE PROTEIN, QT; Future  - CBC WITH AUTOMATED DIFF; Future  - METABOLIC PANEL, COMPREHENSIVE; Future  - SED RATE (ESR); Future  - apremilast (Otezla Starter) 10 mg (4)-20 mg (4)-30 mg(19) DsPk; Take 1 Package by mouth See Admin Instructions. Indications: psoriasis associated with arthritis  Dispense: 27 Tablet; Refill: 0  - apremilast (Otezla) 30 mg tab; Take 30 mg by mouth two (2) times a day. Indications: psoriasis associated with arthritis  Dispense: 60 Tablet; Refill: 11    2. Long-term use of immunosuppressant medication  - CBC WITH AUTOMATED DIFF; Future  - METABOLIC PANEL, COMPREHENSIVE; Future    Patient Instructions   1. I'm applying for Otezla (apremilast) to take for your rash and joint pain, which I think are more reflective of psoriatic arthritis than rheumatoid arthritis.  You'll get a starter pack when we first get this approved, which will step you through the increases to the goal dose of 30mg twice a day. Take these with food. Rosaline Radha can cause diarrhea, stomach upset, and rarely can worsen depression, so stop taking it and let me know if you have a problem with any of these. 2. For breakthrough pain, OK to take ibuprofen 400-600mg ideally not more than twice a day a few days out of the week. Arthritis compression gloves (available from your local pharmacy, or [address]) such as Copperfit can be helpful for pain with activity. Topical Voltaren gel (diclofenac 1%) can be helpful for small joints as well, apply as needed up to 4x/day. 3. Labs ASAP    4. Return in 3-4 months.         TODAY'S ORDERS    Orders Placed This Encounter    QUANTIFERON-TB GOLD PLUS    C REACTIVE PROTEIN, QT    CBC WITH AUTOMATED DIFF    METABOLIC PANEL, COMPREHENSIVE    SED RATE (ESR)    apremilast (Otezla Starter) 10 mg (4)-20 mg (4)-30 mg(19) DsPk    apremilast (Otezla) 30 mg tab     Future Appointments   Date Time Provider Chidi Pollard   5/12/2022 11:00 AM Carolyne Timmons MD AO[facility] BS AMB     Face to face time: 30 minutes  Note preparation and records review day of service: 20 minutes  Total provider time day of service: 50 minutes     Alejandrina Jimenez MD    Adult Rheumatology   [facility]  [facility], [address] [place] [address] Road   Phone [phone]  Fax [phone]

## 2022-02-23 ENCOUNTER — TELEPHONE (OUTPATIENT)
Dept: RHEUMATOLOGY | Age: 57
End: 2022-02-23

## 2022-02-23 NOTE — TELEPHONE ENCOUNTER
Pt called to ask about lab orders she stated she didn't receive any paperwork. Informed pt [facility] can see the orders in the sytems, pt stated she comes to our office for labs. Scheduled pt for an lab appointment.

## 2022-02-24 ENCOUNTER — APPOINTMENT (OUTPATIENT)
Dept: FAMILY MEDICINE CLINIC | Age: 57
End: 2022-02-24

## 2022-02-25 LAB
ALBUMIN SERPL-MCNC: 4.4 G/DL (ref 3.8–4.9)
ALBUMIN/GLOB SERPL: 1.6 {RATIO} (ref 1.2–2.2)
ALP SERPL-CCNC: 72 IU/L (ref 44–121)
ALT SERPL-CCNC: 10 IU/L (ref 0–32)
AST SERPL-CCNC: 12 IU/L (ref 0–40)
BASOPHILS # BLD AUTO: 0.1 X10E3/UL (ref 0–0.2)
BASOPHILS NFR BLD AUTO: 1 %
BILIRUB SERPL-MCNC: 0.6 MG/DL (ref 0–1.2)
BUN SERPL-MCNC: 11 MG/DL (ref 6–24)
BUN/CREAT SERPL: 15 (ref 9–23)
CALCIUM SERPL-MCNC: 9.8 MG/DL (ref 8.7–10.2)
CHLORIDE SERPL-SCNC: 103 MMOL/L (ref 96–106)
CO2 SERPL-SCNC: 23 MMOL/L (ref 20–29)
CREAT SERPL-MCNC: 0.75 MG/DL (ref 0.57–1)
CRP SERPL-MCNC: 2 MG/L (ref 0–10)
EOSINOPHIL # BLD AUTO: 0.1 X10E3/UL (ref 0–0.4)
EOSINOPHIL NFR BLD AUTO: 2 %
ERYTHROCYTE [DISTWIDTH] IN BLOOD BY AUTOMATED COUNT: 11.9 % (ref 11.7–15.4)
ERYTHROCYTE [SEDIMENTATION RATE] IN BLOOD BY WESTERGREN METHOD: 20 MM/HR (ref 0–40)
GLOBULIN SER CALC-MCNC: 2.8 G/DL (ref 1.5–4.5)
GLUCOSE SERPL-MCNC: 118 MG/DL (ref 65–99)
HCT VFR BLD AUTO: 42.7 % (ref 34–46.6)
HGB BLD-MCNC: 14.2 G/DL (ref 11.1–15.9)
IMM GRANULOCYTES # BLD AUTO: 0 X10E3/UL (ref 0–0.1)
IMM GRANULOCYTES NFR BLD AUTO: 1 %
LYMPHOCYTES # BLD AUTO: 2 X10E3/UL (ref 0.7–3.1)
LYMPHOCYTES NFR BLD AUTO: 32 %
MCH RBC QN AUTO: 31.2 PG (ref 26.6–33)
MCHC RBC AUTO-ENTMCNC: 33.3 G/DL (ref 31.5–35.7)
MCV RBC AUTO: 94 FL (ref 79–97)
MONOCYTES # BLD AUTO: 0.6 X10E3/UL (ref 0.1–0.9)
MONOCYTES NFR BLD AUTO: 11 %
NEUTROPHILS # BLD AUTO: 3.3 X10E3/UL (ref 1.4–7)
NEUTROPHILS NFR BLD AUTO: 53 %
PLATELET # BLD AUTO: 253 X10E3/UL (ref 150–450)
POTASSIUM SERPL-SCNC: 4.9 MMOL/L (ref 3.5–5.2)
PROT SERPL-MCNC: 7.2 G/DL (ref 6–8.5)
RBC # BLD AUTO: 4.55 X10E6/UL (ref 3.77–5.28)
SODIUM SERPL-SCNC: 141 MMOL/L (ref 134–144)
WBC # BLD AUTO: 6.1 X10E3/UL (ref 3.4–10.8)

## 2022-02-28 LAB
GAMMA INTERFERON BACKGROUND BLD IA-ACNC: 0.29 IU/ML
M TB IFN-G BLD-IMP: NEGATIVE
M TB IFN-G CD4+ BCKGRND COR BLD-ACNC: 0.29 IU/ML
MITOGEN IGNF BLD-ACNC: >10 IU/ML
QUANTIFERON INCUBATION, QF1T: NORMAL
QUANTIFERON TB2 AG: 0.24 IU/ML
SERVICE CMNT-IMP: NORMAL

## 2022-03-20 PROBLEM — M05.79 SEROPOSITIVE RHEUMATOID ARTHRITIS OF MULTIPLE SITES (HCC): Status: ACTIVE | Noted: 2021-05-04

## 2022-05-13 ENCOUNTER — DOCUMENTATION ONLY (OUTPATIENT)
Dept: PHARMACY | Age: 57
End: 2022-05-13

## 2022-05-13 NOTE — PROGRESS NOTES
[facility] at [address] [facility] Update    Date: 5/13/2022    Mati Dumont 1965     Medication: Henrik Led Starter Pack and Henrik Led 30mg tablets    Co-pay $0    Fill: 4/21/2022 and 5/12/2022    Ship: 4/21/2022 and 5/16/2022    Deliver: 4/22/2022 and 5/17/2022    Next Fill Due: 6/7/2022    Pharmacist offered counseling to the patient. Handout provided.     Georgia Thomas, [address] at [facility],  Ajith Ryne   [place], [address]  phone: [phone]   fax: [phone]

## 2022-08-08 NOTE — PROGRESS NOTES
REASON FOR VISIT    This is a follow-up visit for Ms. Nydia Marvin for RF+ psoriatic arthritis. Inflammatory arthritis phenotype includes:  Anti-CCP positive: no  Rheumatoid factor positive: yes (33)  Erosive disease: no  Extra-articular manifestations include: psoriasis aviva glabella, eyebrows    Immunosuppression Screening (1/21/2021): Quantiferon TB: negative  PPD:  Not performed  Hepatitis B: negative  Hepatitis C: negative    Therapy History includes:  Current DMARD therapy include: apremilast (2/22- )  Prior DMARD therapy include: hydroxychloroquine 200 mg daily (2015 to 2017, 12/02/2020 to 3/08/2021), methotrexate 20 mg every Wednesday (3/08/2021 to 8/10/2021; stopped due to LFT elevated EtOH vs MTX), Humira 40 mg every 14 days (08/2021-2/2022; SAMPLE)  Discontinued DMARDs because of inefficacy: hydroxychloroquine  Discontinued DMARDs because of side effects: None    HISTORY OF PRESENT ILLNESS    Ms. Nydia Marvin returns for a follow-up. Pt takes Otezla twice per day. She has been irregular with it lately because she has been dealing with her elderly mother who was sick before passing. Pt reports that her hands, knees, ankles, and hips are her main joint complaints. She notes that her knees and hips get better throughout the day, but her hands are constantly painful. Pt takes 600mg of Ibuprofen when she has joint pain. She says that she has used 1,000-1,500mg in the past, feels she overall requires less now. Pt denies problems with her shoulder joints. Pt reports that she has a neuroma under her R metatarsals. Pt reports that her feet bother her more towards the end of the day. Pt reports she keeps getting flare ups of psoriasis on her face. She recalls that she did not have these flare ups when she was first diagnosed with RA. Pt denies nail changes, problems breathing. She had one sinus infection last month, but has been otherwise healthy. Pt notes that she just lost her mother last Thursday. REVIEW OF SYSTEMS    A comprehensive review of systems was performed and pertinent results are documented in the HPI, review of systems is otherwise non-contributory. PAST MEDICAL HISTORY    She has a past medical history of Arthritis and Hypertension. FAMILY HISTORY    Her family history includes Arthritis-rheumatoid in her brother; Diabetes in her father and mother; Gout in her brother and sister; Heart Attack in her father; OSTEOARTHRITIS in her father and mother; Other in her father and mother. SOCIAL HISTORY    She reports that she has been smoking. She has been smoking an average of .5 packs per day. She has never used smokeless tobacco. She reports current alcohol use of about 10.0 standard drinks per week. She reports that she does not use drugs. MEDICATIONS    Current Outpatient Medications   Medication Sig    apremilast (Otezla Starter) 10 mg (4)-20 mg (4)-30 mg(19) DsPk Take 1 Package by mouth See Admin Instructions. Indications: psoriasis associated with arthritis    apremilast (Otezla) 30 mg tab Take 30 mg by mouth two (2) times a day. Indications: psoriasis associated with arthritis    folic acid (FOLVITE) 1 mg tablet Take 1 Tab by mouth daily. (Patient not taking: Reported on 11/11/2021)    ergocalciferol (ERGOCALCIFEROL) 1,250 mcg (50,000 unit) capsule Take 1 Cap by mouth every seven (7) days. Indications: vitamin D deficiency (high dose therapy) (Patient not taking: Reported on 8/10/2021)     No current facility-administered medications for this visit. ALLERGIES    Allergies   Allergen Reactions    Carrot Other (comments)     \"upset stomach\"    Codeine Palpitations       PHYSICAL EXAMINATION    Visit Vitals  /81 (BP 1 Location: Left upper arm, BP Patient Position: Sitting)   Pulse 68   Temp 98.2 °F (36.8 °C) (Oral)   Resp 18   Ht 5' 4\" (1.626 m)   Wt 130 lb 6.4 oz (59.1 kg)   SpO2 96%   BMI 22.38 kg/m²       There is no height or weight on file to calculate BMI.     [facility] 1.0  Patient pain: 60/100  Patient global: 50/100  MD global: 3/10    General:  The patient is well developed, well nourished, alert, and in no apparent distress. Eyes: Sclera are anicteric. No conjunctival injection. HEENT:  Oropharynx is clear. No oral ulcers. Adequate salivary pooling. No cervical or supraclavicular lymphadenopathy. Lungs:  Clear to auscultation bilaterally, without wheeze or stridor. Normal respiratory effort. Cor:  Regular rate and rhythm. No murmur rub or gallop. Abdomen: Soft, non-tender, without hepatomegaly or masses. Extremities: No calf tenderness or edema. Warm and well perfused. Skin: Linear plaques over glabella and eyebrows   Neuro: Nonfocal, no foot or wrist drop  Musculoskeletal:    A comprehensive musculoskeletal exam was performed for all joints of each upper and lower extremity and assessed for swelling, tenderness and range of motion. Results are documented as below:  Prominent quin and heberden nodes, no lon synovitis. Right 5th PIP flexion deformity posttraumatic per pt  Bilateral groin tenderness with passive hip rotation  Bilateral knee crepitus  Bilateral pes cavus  Persistent tenderness between right 2nd and 3rd mtps  No evidence of synovitis in the small joints of the hands, wrists, shoulders, elbows, hips, knees or ankles. DATA REVIEW    Laboratory     Recent laboratory results were reviewed, summarized, and discussed with the patient. 2/24/22: ESR 20, Cr 0.75, LFT WNL, CBC WNL, CRP 2 mg/L, QuantiFERON plus neg  11/11/21: CBC WNL, Cr 0.69, LFTs WNL, uric acid 6.6  8/11/21: Vit D 23.9    Imaging    Musculoskeletal Ultrasound    None    Radiographs    Bilateral Hand 2/25/2021: RIGHT: There is no acute fracture or dislocation. There are moderate first carpometacarpal osteoarthritic degenerative changes. There are mild DIP osteoarthritic degenerative changes. Bones are well mineralized. Soft tissues are normal. No osseous erosion is visualized. LEFT: There is no acute fracture or dislocation. There are moderate first carpometacarpal osteoarthritic degenerative changes. Bones are well mineralized. Soft tissues are normal. No osseous erosion is visualized. Bilateral Hip 2/25/2021: no fracture, dislocation or other acute abnormality. Degenerative changes are seen in the hip joints bilaterally. Bilateral Foot 2/25/2021: RIGHT: No acute fracture or dislocation. Degenerative changes are seen between the first metatarsal head and sesamoid bones. There is mild first MTP osteoarthritis. No erosions. The soft tissues appear unremarkable. LEFT: no fracture or other acute osseous or articular abnormality. The soft tissues are within normal limits. CT Imaging    None    MR Imaging    None    DXA     None    ASSESSMENT AND PLAN    This is a follow-up visit for Ms. Marianela Johnson for psoriatic arthritis manifest with facial psoriaform plaques, with mild facial plaques on inconsistent dosing, but still no overt synovitis. Continuing current regimen. 1. Psoriatic arthritis ([address]  - Cont Otezla twice a day, reinforced consistency. - C REACTIVE PROTEIN, QT; Future  - CBC WITH AUTOMATED DIFF; Future  - METABOLIC PANEL, COMPREHENSIVE; Future  - SED RATE (ESR); Future    2. Long-term use of immunosuppressant medication  - CBC WITH AUTOMATED DIFF; Future  - METABOLIC PANEL, COMPREHENSIVE; Future    Patient Instructions   1) Continue to take 1 pill of Otezla twice per day. If you feel a worsening of mood or increased diarrhea let me know. 2) You can take up to 600mg of Ibuprofen for joint pain. Try not to take this more than 3 days for week. 3) Update labs at your earliest convenience. 4) Follow up in 4 months. Let me know if you have any questions or concerns in the meantime.        TODAY'S ORDERS    Orders Placed This Encounter    C REACTIVE PROTEIN, QT    CBC WITH AUTOMATED DIFF    METABOLIC PANEL, COMPREHENSIVE    SED RATE (ESR)       No future appointments.     Face to face time: 15  minutes  Note preparation and records review day of service:  32 minutes  Total provider time day of service: 47 minutes     This was scribed by Vamsi Lopez in the presence of Dr. Tamika Jenkins MD    Adult Rheumatology   [address]  [facility], [facility]   Phone [phone]  Fax [phone] - - -

## 2022-08-09 ENCOUNTER — OFFICE VISIT (OUTPATIENT)
Dept: RHEUMATOLOGY | Age: 57
End: 2022-08-09

## 2022-08-09 VITALS
OXYGEN SATURATION: 96 % | HEART RATE: 68 BPM | WEIGHT: 130.4 LBS | DIASTOLIC BLOOD PRESSURE: 81 MMHG | HEIGHT: 64 IN | BODY MASS INDEX: 22.26 KG/M2 | SYSTOLIC BLOOD PRESSURE: 130 MMHG | RESPIRATION RATE: 18 BRPM | TEMPERATURE: 98.2 F

## 2022-08-09 DIAGNOSIS — Z79.60 LONG-TERM USE OF IMMUNOSUPPRESSANT MEDICATION: ICD-10-CM

## 2022-08-09 DIAGNOSIS — L40.50 PSORIATIC ARTHRITIS (HCC): Primary | ICD-10-CM

## 2022-08-09 PROCEDURE — 99215 OFFICE O/P EST HI 40 MIN: CPT | Performed by: INTERNAL MEDICINE

## 2022-08-09 NOTE — PROGRESS NOTES
Chief Complaint   Patient presents with    Arthritis    Joint Pain     Hips, knees, hands     1. Have you been to the ER, urgent care clinic since your last visit? Hospitalized since your last visit? Yes Where: [facility]    2. Have you seen or consulted any other health care providers outside of the [address] [place] [address] since your last visit? Include any pap smears or colon screening.  No

## 2022-08-09 NOTE — LETTER
9/5/2022    Patient: Deandra Dominguez   YOB: 1965   Date of Visit: 8/9/2022     Lani Joseph MD  [address] [place] [address]  Via Fax: [phone]    Dear Lani Joseph MD,    We recently saw Ms. Deandra Dominguez in the [facility] for evaluation. My notes for this consultation are attached. If you have questions, please do not hesitate to call me. I look forward to following your patient along with you.     Sincerely,  Hortencia Smith MD S  Cell: [phone]

## 2023-05-04 ENCOUNTER — OFFICE VISIT (OUTPATIENT)
Dept: RHEUMATOLOGY | Age: 58
End: 2023-05-04
Payer: MEDICAID

## 2023-05-04 VITALS
WEIGHT: 128.8 LBS | OXYGEN SATURATION: 98 % | RESPIRATION RATE: 16 BRPM | HEART RATE: 66 BPM | TEMPERATURE: 97 F | BODY MASS INDEX: 22.11 KG/M2 | SYSTOLIC BLOOD PRESSURE: 140 MMHG | DIASTOLIC BLOOD PRESSURE: 84 MMHG

## 2023-05-04 DIAGNOSIS — E55.9 VITAMIN D DEFICIENCY: ICD-10-CM

## 2023-05-04 DIAGNOSIS — Z79.60 LONG-TERM USE OF IMMUNOSUPPRESSANT MEDICATION: ICD-10-CM

## 2023-05-04 DIAGNOSIS — L40.50 PSORIATIC ARTHRITIS (HCC): Primary | ICD-10-CM

## 2023-05-04 PROCEDURE — 99215 OFFICE O/P EST HI 40 MIN: CPT | Performed by: INTERNAL MEDICINE

## 2023-05-04 RX ORDER — ADALIMUMAB 40MG/0.4ML
40 KIT SUBCUTANEOUS
Qty: 2 KIT | Refills: 5 | Status: SHIPPED | OUTPATIENT
Start: 2023-05-04

## 2023-05-09 DIAGNOSIS — Z79.60 LONG-TERM USE OF IMMUNOSUPPRESSANT MEDICATION: ICD-10-CM

## 2023-05-09 DIAGNOSIS — L40.50 PSORIATIC ARTHRITIS (HCC): Primary | ICD-10-CM

## 2023-05-15 DIAGNOSIS — L40.50 ARTHROPATHIC PSORIASIS, UNSPECIFIED (HCC): ICD-10-CM

## 2023-05-15 DIAGNOSIS — L40.50 ARTHROPATHIC PSORIASIS, UNSPECIFIED (HCC): Primary | ICD-10-CM

## 2023-05-15 RX ORDER — ADALIMUMAB 40MG/0.4ML
40 KIT SUBCUTANEOUS
Qty: 1 EACH | Refills: 5 | Status: ACTIVE | OUTPATIENT
Start: 2023-05-15

## 2023-05-15 NOTE — TELEPHONE ENCOUNTER
Last office visit 05/04/23  Lab Results   Component Value Date    WBC 6.1 02/24/2022    HGB 14.2 02/24/2022    HCT 42.7 02/24/2022    MCV 94 02/24/2022     02/24/2022     Lab Results   Component Value Date     02/24/2022    K 4.9 02/24/2022     02/24/2022    CO2 23 02/24/2022    BUN 11 02/24/2022    CREATININE 0.75 02/24/2022    GLUCOSE 118 (H) 02/24/2022    CALCIUM 9.8 02/24/2022    PROT 7.2 02/24/2022    LABALBU 4.4 02/24/2022    BILITOT 0.6 02/24/2022    ALKPHOS 72 02/24/2022    AST 12 02/24/2022    ALT 10 02/24/2022    GFRAA 103 02/24/2022    AGRATIO 1.6 02/24/2022    GLOB 3.2 11/11/2021

## 2023-05-16 ENCOUNTER — TELEPHONE (OUTPATIENT)
Age: 58
End: 2023-05-16

## 2023-05-16 RX ORDER — ADALIMUMAB 40MG/0.4ML
40 KIT SUBCUTANEOUS
Qty: 1 EACH | Refills: 5 | OUTPATIENT
Start: 2023-05-16

## 2023-06-28 ENCOUNTER — OFFICE VISIT (OUTPATIENT)
Age: 58
End: 2023-06-28
Payer: MEDICAID

## 2023-06-28 VITALS
HEART RATE: 70 BPM | WEIGHT: 128 LBS | DIASTOLIC BLOOD PRESSURE: 90 MMHG | SYSTOLIC BLOOD PRESSURE: 154 MMHG | TEMPERATURE: 96.7 F | BODY MASS INDEX: 21.97 KG/M2 | RESPIRATION RATE: 18 BRPM | OXYGEN SATURATION: 99 %

## 2023-06-28 DIAGNOSIS — L40.50 PSORIATIC ARTHRITIS (HCC): Primary | ICD-10-CM

## 2023-06-28 DIAGNOSIS — Z79.899 ONGOING USE OF POSSIBLY TOXIC MEDICATION: ICD-10-CM

## 2023-06-28 DIAGNOSIS — E55.9 VITAMIN D DEFICIENCY, UNSPECIFIED: ICD-10-CM

## 2023-06-28 DIAGNOSIS — R76.8 RHEUMATOID FACTOR POSITIVE: ICD-10-CM

## 2023-06-28 PROCEDURE — 99214 OFFICE O/P EST MOD 30 MIN: CPT | Performed by: INTERNAL MEDICINE

## 2023-06-28 RX ORDER — ERGOCALCIFEROL 1.25 MG/1
50000 CAPSULE ORAL
Qty: 12 CAPSULE | Refills: 1 | Status: SHIPPED | OUTPATIENT
Start: 2023-06-28

## 2023-06-28 ASSESSMENT — PATIENT HEALTH QUESTIONNAIRE - PHQ9
1. LITTLE INTEREST OR PLEASURE IN DOING THINGS: 0
2. FEELING DOWN, DEPRESSED OR HOPELESS: 1
SUM OF ALL RESPONSES TO PHQ9 QUESTIONS 1 & 2: 1
SUM OF ALL RESPONSES TO PHQ QUESTIONS 1-9: 1

## 2023-07-12 ENCOUNTER — TELEPHONE (OUTPATIENT)
Age: 58
End: 2023-07-12

## 2023-07-12 NOTE — TELEPHONE ENCOUNTER
PT left a VM requesting to be put on a list for the new physician or a physician in the office, I called back and left a VM informing the PT that because she was a  PT she will be contacted when we find a new physician and unfortunately theres no one at our office that can take over her care.  I provided the office number incase there was questions

## 2023-07-18 ENCOUNTER — HOSPITAL ENCOUNTER (OUTPATIENT)
Facility: HOSPITAL | Age: 58
Setting detail: RECURRING SERIES
Discharge: HOME | End: 2023-07-21
Payer: MEDICAID

## 2023-07-18 PROCEDURE — 97161 PT EVAL LOW COMPLEX 20 MIN: CPT

## 2023-07-18 PROCEDURE — 97110 THERAPEUTIC EXERCISES: CPT

## 2023-07-18 NOTE — THERAPY EVALUATION
shoes, apartment  Mobility: I  Self Care:  I  Previous Treatment/Compliance: good  PMHx/Surgical Hx/Comorbidites: see chart  Prior Hospitalization:  see chart  Barriers: []pain []Financial []time []transportation []Other:  Substance use: []Alcohol []Tobacco []other:   Pt Goals: \"improve alignment\"  Motivation: good  Cognition: A & O x 4               OBJECTIVE    Objective:      Posture:   R anterior innominate rotation  R LE appears to be 1 inch longer than L in supine  R shoulder elevated compared to L    Gait:   Description: slower gait speed, L lateral hip sway    Active Movements:  ROM  AROM Comments:pain, area   Forward flexion  To 4\" from floor tightness   Extension  full pain   Seated Rotation right full pain   Seated Rotation left full    SB right full    SB left full      ROM:  R hip PROM: flexion 100 deg, ER 40 deg, IR 10 deg, hamstrings 50 deg  L hip PROM: flexion 120 deg, ER 70 deg, IR 30 deg, hamstrings 70 deg    Strength:      L(0-5) R (0-5) N/T   Hip Flexion (L1,2) 4 4 []   Knee Extension (L3,4) 5 5 []     Comments: pain in R low back and L hip flexion    Neurosensory:  Sensory examination reveals normal    Palpation:  TTP over bilateral SI joints and multifidus    Special Tests:    Sacroillic:  Gaenslen's: [x] R    [] L    [x] +    [] -     Compression: [] +    [] -     Gapping:  [] +    [] -     Thigh Thrust: [] R    [] L    [] +    [] -     Sacral Thrust: [] R    [] L    [] +    [] -     Leg Length: [x] +    [] -   Position: supine           Hip: Elkin Angers:   [x] R    [] L    [x] +    [] -     Scour:   [] R    [] L    [] +    [] -     Piriformis(fair): [] R    [] L    [] +    [] -          Flexibility Deficits:Elvin's: [] R    [] L    [] +    [] -      Ziyad: [] R    [] L    [] +    [] -      Hamstrings: [] R    [] L    [] +    [] -     Joint mobility:  decreased lumbar mobility      Objective/Functional Outcome Measure:  FOTO Score: next visit  FOTO score = an established functional score where 100 =

## 2023-07-21 ENCOUNTER — HOSPITAL ENCOUNTER (OUTPATIENT)
Facility: HOSPITAL | Age: 58
Setting detail: RECURRING SERIES
Discharge: HOME | End: 2023-07-24
Payer: MEDICAID

## 2023-07-21 PROCEDURE — 97140 MANUAL THERAPY 1/> REGIONS: CPT

## 2023-07-21 PROCEDURE — 97110 THERAPEUTIC EXERCISES: CPT

## 2023-07-25 ENCOUNTER — HOSPITAL ENCOUNTER (OUTPATIENT)
Facility: HOSPITAL | Age: 58
Setting detail: RECURRING SERIES
Discharge: HOME | End: 2023-07-28
Payer: MEDICAID

## 2023-07-25 PROCEDURE — 97140 MANUAL THERAPY 1/> REGIONS: CPT

## 2023-07-25 PROCEDURE — 97110 THERAPEUTIC EXERCISES: CPT

## 2023-07-25 NOTE — PROGRESS NOTES
improve patient's ability to progress to PLOF and address remaining functional goals. min [] Estim Unattended,             type/location:       []  w/ice    []  w/heat        min [] Estim Attended,             type/location:       []  w/ice   []  w/heat         []  w/US   []  TENS insruct            min []  Mechanical Traction,        type/lbs:        []  pro      []  sup           []  int       []  cont            []  before manual           []  after manual     min []  Ultrasound,         settings/location:     10 min  unbilled []  Ice     [x]  Heat            location/position: supine/ lumbar spine         min []  Vasopneumatic Device,      press/temp:   pre-treatment girth :    post-treatment girth :    measured at (landmark       location) : If using vaso (only need to measure limb vaso being performed on)        min []  Other:        Skin assessment post-treatment (if applicable):    [x]  intact    []  redness- no adverse reaction                 []redness - adverse reaction:          [x]  Patient Education billed concurrently with other procedures   [x] Review HEP    [] Progressed/Changed HEP, detail:    [] Other detail:         Other Objective/Functional Measures  NT    Pain Level at end of session (0-10 scale): 0/10      Assessment   Pt felt great relief by end of session. Pt making good progress overall towards goals. Patient will continue to benefit from skilled PT / OT services to modify and progress therapeutic interventions, analyze and address functional mobility deficits, analyze and address ROM deficits, analyze and address strength deficits, and instruct in home and community integration to address functional deficits and attain remaining goals.     Progress toward goals / Updated goals:  []  See Progress Note/Recertification    NT      PLAN  Yes  Continue plan of care  Re-Cert Due: 36/84/52  []  Upgrade activities as tolerated  []  Discharge due to :  []  Other:      Ashley Vazquez, PT

## 2023-08-02 ENCOUNTER — HOSPITAL ENCOUNTER (OUTPATIENT)
Facility: HOSPITAL | Age: 58
Setting detail: RECURRING SERIES
Discharge: HOME | End: 2023-08-05
Payer: MEDICAID

## 2023-08-02 PROCEDURE — 97140 MANUAL THERAPY 1/> REGIONS: CPT

## 2023-08-02 PROCEDURE — 97110 THERAPEUTIC EXERCISES: CPT

## 2023-08-02 NOTE — PROGRESS NOTES
PHYSICAL THERAPY - DAILY TREATMENT NOTE (updated 3/23)      Date: 2023          Patient Name:  Melissa Walton :  1965   Medical   Diagnosis:  Low back pain [M54.50] Treatment Diagnosis:  M54.59  OTHER LOWER BACK PAIN    Referral Source:  Thu Yu MD Insurance:   Payor: [place] / Plan: [employer] / Product Type: *No Product type* /                     Patient  verified yes     Visit #   Current  / Total 4 24   Time   In / Out 12:00 pm 12:50 pm   Total Treatment Time 50   Total Timed Codes 40         SUBJECTIVE    Pain Level (0-10 scale): 0/10    Any medication changes, allergies to medications, adverse drug reactions, diagnosis change, or new procedure performed?: [x] No    [] Yes (see summary sheet for update)  Medications: Verified on Patient Summary List    Subjective functional status/changes:     Patient reports she is having a good day today. OBJECTIVE      Therapeutic Procedures: Tx Min Billable or 1:1 Min (if diff from Tx Min) Procedure, Rationale, Specifics   30  [id number] Therapeutic Exercise (timed):  increase ROM, strength, coordination, balance, and proprioception to improve patient's ability to progress to PLOF and address remaining functional goals. (see flow sheet as applicable)     Details if applicable:     10  37933 Manual Therapy (timed):  decrease pain, increase ROM, increase tissue extensibility, and increase postural awareness to improve patient's ability to progress to PLOF and address remaining functional goals. The manual therapy interventions were performed at a separate and distinct time from the therapeutic activities interventions .  (see flow sheet as applicable)     Details if applicable:  STM R gluts and piriformis muscles, manual B hamstring and Hip ER stretching   40     Total Total         Modalities Rationale:     decrease inflammation, decrease pain, and increase tissue extensibility to improve patient's ability to progress to PLOF and

## 2023-08-04 ENCOUNTER — HOSPITAL ENCOUNTER (OUTPATIENT)
Facility: HOSPITAL | Age: 58
Setting detail: RECURRING SERIES
Discharge: HOME | End: 2023-08-07
Payer: MEDICAID

## 2023-08-04 PROCEDURE — 97110 THERAPEUTIC EXERCISES: CPT | Performed by: PHYSICAL THERAPIST

## 2023-08-04 PROCEDURE — 97140 MANUAL THERAPY 1/> REGIONS: CPT | Performed by: PHYSICAL THERAPIST

## 2023-08-04 NOTE — PROGRESS NOTES
PHYSICAL THERAPY - DAILY TREATMENT NOTE (updated 3/23)      Date: 2023          Patient Name:  Iliana Torres :  1965   Medical   Diagnosis:  Low back pain [M54.50] Treatment Diagnosis:  M54.59  OTHER LOWER BACK PAIN    Referral Source:  Zuleyma Brower MD Insurance:   Payor: [employer]LEX[employer]DR [place] / Plan: [employer] / Product Type: *No Product type* /                     Patient  verified yes     Visit #   Current  / Total 5 24   Time   In / Out 11:38 pm 1238 pm   Total Treatment Time 60   Total Timed Codes 50         SUBJECTIVE    Pain Level (0-10 scale): 5/10    Any medication changes, allergies to medications, adverse drug reactions, diagnosis change, or new procedure performed?: [x] No    [] Yes (see summary sheet for update)  Medications: Verified on Patient Summary List    Subjective functional status/changes:     Patient reports she is having more pain into mid back today and feels very sore. OBJECTIVE      Therapeutic Procedures: Tx Min Billable or 1:1 Min (if diff from Tx Min) Procedure, Rationale, Specifics   40  48174 Therapeutic Exercise (timed):  increase ROM, strength, coordination, balance, and proprioception to improve patient's ability to progress to PLOF and address remaining functional goals. (see flow sheet as applicable)     Details if applicable:     10  23178 Manual Therapy (timed):  decrease pain, increase ROM, increase tissue extensibility, and increase postural awareness to improve patient's ability to progress to PLOF and address remaining functional goals. The manual therapy interventions were performed at a separate and distinct time from the therapeutic activities interventions .  (see flow sheet as applicable)     Details if applicable:  STM R gluts and piriformis muscles, manual B hamstring and Hip ER stretching   50     Total Total         Modalities Rationale:     decrease inflammation, decrease pain, and increase tissue extensibility to improve patient's

## 2023-08-08 ENCOUNTER — APPOINTMENT (OUTPATIENT)
Facility: HOSPITAL | Age: 58
End: 2023-08-08
Payer: MEDICAID

## 2023-08-11 ENCOUNTER — HOSPITAL ENCOUNTER (OUTPATIENT)
Facility: HOSPITAL | Age: 58
Setting detail: RECURRING SERIES
Discharge: HOME | End: 2023-08-14
Payer: MEDICAID

## 2023-08-11 PROCEDURE — 97110 THERAPEUTIC EXERCISES: CPT

## 2023-08-11 PROCEDURE — 97140 MANUAL THERAPY 1/> REGIONS: CPT

## 2023-08-11 NOTE — PROGRESS NOTES
PHYSICAL THERAPY - DAILY TREATMENT NOTE (updated 3/23)      Date: 2023          Patient Name:  Kriss Baldwin :  1965   Medical   Diagnosis:  Low back pain [M54.50] Treatment Diagnosis:  M54.59  OTHER LOWER BACK PAIN    Referral Source:  Tyler Cee MD Insurance:   Payor: [address] / Plan: Eliot Duong / Product Type: *No Product type* /                     Patient  verified yes     Visit #   Current  / Total 6 24   Time   In / Out 10:40 Am 11:45 Am   Total Treatment Time 65   Total Timed Codes 55         SUBJECTIVE    Pain Level (0-10 scale): \"stiff\"/10    Any medication changes, allergies to medications, adverse drug reactions, diagnosis change, or new procedure performed?: [x] No    [] Yes (see summary sheet for update)  Medications: Verified on Patient Summary List    Subjective functional status/changes:     Patient states she feels like her R hip is \"coming along\" reports improved flexibility in her hip and low back. OBJECTIVE      Therapeutic Procedures: Tx Min Billable or 1:1 Min (if diff from Tx Min) Procedure, Rationale, Specifics   45  03124 Therapeutic Exercise (timed):  increase ROM, strength, coordination, balance, and proprioception to improve patient's ability to progress to PLOF and address remaining functional goals. (see flow sheet as applicable)     Details if applicable:     10  02857 Manual Therapy (timed):  decrease pain, increase ROM, increase tissue extensibility, and increase postural awareness to improve patient's ability to progress to PLOF and address remaining functional goals. The manual therapy interventions were performed at a separate and distinct time from the therapeutic activities interventions .  (see flow sheet as applicable)     Details if applicable:  STM R gluts and piriformis muscles, manual B hamstring and Hip ER stretching   55     Total Total         Modalities Rationale:     decrease inflammation, decrease pain, and increase

## 2023-08-21 ENCOUNTER — APPOINTMENT (OUTPATIENT)
Facility: HOSPITAL | Age: 58
End: 2023-08-21
Payer: MEDICAID

## 2023-08-24 ENCOUNTER — HOSPITAL ENCOUNTER (OUTPATIENT)
Facility: HOSPITAL | Age: 58
Setting detail: RECURRING SERIES
Discharge: HOME | End: 2023-08-27
Payer: MEDICAID

## 2023-08-24 PROCEDURE — 97140 MANUAL THERAPY 1/> REGIONS: CPT

## 2023-08-24 PROCEDURE — 97110 THERAPEUTIC EXERCISES: CPT

## 2023-08-24 NOTE — PROGRESS NOTES
PHYSICAL THERAPY - DAILY TREATMENT NOTE (updated 3/23)      Date: 2023          Patient Name:  Colleen Spivey :  1965   Medical   Diagnosis:  Low back pain [M54.50] Treatment Diagnosis:  M54.59  OTHER LOWER BACK PAIN    Referral Source:  Greg Mas MD Insurance:   Payor: ALEXANDR Kangángela [place] / Plan: [employer] / Product Type: *No Product type* /                     Patient  verified yes     Visit #   Current  / Total 7 24   Time   In / Out 10:10 am 11:05 am   Total Treatment Time 55   Total Timed Codes 45         SUBJECTIVE    Pain Level (0-10 scale): \"stiff\"/10    Any medication changes, allergies to medications, adverse drug reactions, diagnosis change, or new procedure performed?: [x] No    [] Yes (see summary sheet for update)  Medications: Verified on Patient Summary List    Subjective functional status/changes:     Patient states she is stiff and sore all over. OBJECTIVE      Therapeutic Procedures: Tx Min Billable or 1:1 Min (if diff from Tx Min) Procedure, Rationale, Specifics   35  61652 Therapeutic Exercise (timed):  increase ROM, strength, coordination, balance, and proprioception to improve patient's ability to progress to PLOF and address remaining functional goals. (see flow sheet as applicable)     Details if applicable:     10  88253 Manual Therapy (timed):  decrease pain, increase ROM, increase tissue extensibility, and increase postural awareness to improve patient's ability to progress to PLOF and address remaining functional goals. The manual therapy interventions were performed at a separate and distinct time from the therapeutic activities interventions .  (see flow sheet as applicable)     Details if applicable:  STM R gluts and piriformis muscles, manual B hamstring and Hip ER stretching   45     Total Total         Modalities Rationale:     decrease inflammation, decrease pain, and increase tissue extensibility to improve patient's ability to progress to

## 2023-08-28 ENCOUNTER — HOSPITAL ENCOUNTER (OUTPATIENT)
Facility: HOSPITAL | Age: 58
Setting detail: RECURRING SERIES
Discharge: HOME | End: 2023-08-31
Payer: MEDICAID

## 2023-08-28 PROCEDURE — 97140 MANUAL THERAPY 1/> REGIONS: CPT

## 2023-08-28 PROCEDURE — 97110 THERAPEUTIC EXERCISES: CPT

## 2023-08-28 NOTE — PROGRESS NOTES
PLOF and address remaining functional goals. 10 min  unbilled []  Ice     [x]  Heat            location/position: supine/ lumbar spine      Skin assessment post-treatment (if applicable):    [x]  intact    []  redness- no adverse reaction                 []redness - adverse reaction:          [x]  Patient Education billed concurrently with other procedures   [x] Review HEP    [] Progressed/Changed HEP, detail:    [] Other detail:         Other Objective/Functional Measures  NT    Pain Level at end of session (0-10 scale): 0/10      Assessment   Pt demonstrated improved symptoms following manual techniques. Good tolerance to exercises. Patient will continue to benefit from skilled PT / OT services to modify and progress therapeutic interventions, analyze and address functional mobility deficits, analyze and address ROM deficits, analyze and address strength deficits, and instruct in home and community integration to address functional deficits and attain remaining goals.     Progress toward goals / Updated goals:  []  See Progress Note/Recertification    NT      PLAN  Yes  Continue plan of care  Re-Cert Due: 48/80/35  []  Upgrade activities as tolerated  []  Discharge due to :  []  Other:      Ian Jones PTA       8/28/2023       3:28 PM

## 2023-08-31 ENCOUNTER — APPOINTMENT (OUTPATIENT)
Facility: HOSPITAL | Age: 58
End: 2023-08-31
Payer: MEDICAID

## 2023-09-06 ENCOUNTER — HOSPITAL ENCOUNTER (OUTPATIENT)
Facility: HOSPITAL | Age: 58
Setting detail: RECURRING SERIES
Discharge: HOME | End: 2023-09-09
Payer: MEDICAID

## 2023-09-06 PROCEDURE — 97140 MANUAL THERAPY 1/> REGIONS: CPT

## 2023-09-06 PROCEDURE — 97110 THERAPEUTIC EXERCISES: CPT

## 2023-09-06 NOTE — PROGRESS NOTES
PHYSICAL THERAPY - DAILY TREATMENT NOTE (updated 3/23)      Date: 2023          Patient Name:  Rosey James :  1965   Medical   Diagnosis:  Low back pain [M54.50] Treatment Diagnosis:  M54.59  OTHER LOWER BACK PAIN    Referral Source:  Maurilio Pearson MD Insurance:   Payor: [address] / Plan: [employer] / Product Type: *No Product type* /                     Patient  verified yes     Visit #   Current  / Total 9 24   Time   In / Out 10:30 Am 11:20 Am   Total Treatment Time 50   Total Timed Codes 40         SUBJECTIVE    Pain Level (0-10 scale): 5/10    Any medication changes, allergies to medications, adverse drug reactions, diagnosis change, or new procedure performed?: [x] No    [] Yes (see summary sheet for update)  Medications: Verified on Patient Summary List    Subjective functional status/changes:     Patient reports \"It was a rough weekend because I was at work and did a lot of climbing, stooping and standing. \"    OBJECTIVE      Therapeutic Procedures: Tx Min Billable or 1:1 Min (if diff from Tx Min) Procedure, Rationale, Specifics   25  [id number] Therapeutic Exercise (timed):  increase ROM, strength, coordination, balance, and proprioception to improve patient's ability to progress to PLOF and address remaining functional goals. (see flow sheet as applicable)     Details if applicable:     15  [id number] Manual Therapy (timed):  decrease pain, increase ROM, increase tissue extensibility, and increase postural awareness to improve patient's ability to progress to PLOF and address remaining functional goals. The manual therapy interventions were performed at a separate and distinct time from the therapeutic activities interventions . (see flow sheet as applicable)     Details if applicable:  STM R gluts and piriformis muscles,     Omit manual B hamstring and Hip ER stretching.  LAD LLE   40     Total Total         Modalities Rationale:     decrease inflammation, decrease pain, and

## 2023-09-25 NOTE — PATIENT INSTRUCTIONS
1) Continue to take 1 pill of Otezla twice per day. If you feel a worsening of mood or increased diarrhea let me know. 2) You can take up to 600mg of Ibuprofen for joint pain. Try not to take this more than 3 days for week. 3) Update labs at your earliest convenience. 4) Follow up in 4 months. Let me know if you have any questions or concerns in the meantime. Use Enhanced Medication Counseling?: No